# Patient Record
Sex: MALE
[De-identification: names, ages, dates, MRNs, and addresses within clinical notes are randomized per-mention and may not be internally consistent; named-entity substitution may affect disease eponyms.]

---

## 2017-07-08 ENCOUNTER — HOSPITAL ENCOUNTER (EMERGENCY)
Dept: HOSPITAL 25 - UCCORT | Age: 72
Discharge: HOME | End: 2017-07-08
Payer: MEDICARE

## 2017-07-08 VITALS — SYSTOLIC BLOOD PRESSURE: 130 MMHG | DIASTOLIC BLOOD PRESSURE: 86 MMHG

## 2017-07-08 DIAGNOSIS — D18.01: Primary | ICD-10-CM

## 2017-07-08 DIAGNOSIS — I10: ICD-10-CM

## 2017-07-08 DIAGNOSIS — Z87.891: ICD-10-CM

## 2017-07-08 PROCEDURE — 99201: CPT

## 2017-07-08 PROCEDURE — G0463 HOSPITAL OUTPT CLINIC VISIT: HCPCS

## 2017-07-17 NOTE — UC
Skin Complaint HPI





- HPI Summary


HPI Summary: 





patient had a purplish spot on his chin, knicked it while shaving and it bled 

profusely for about 2 hours. has since stopped





- History of Current Complaint


Chief Complaint: UCSkin


Time Seen by Provider: 07/08/17 19:52


Stated Complaint: CHIN SKIN COMPLAINT (BLEEDING)


Hx Obtained From: Patient


Onset/Duration: Sudden Onset, Lasting Hours


Skin Exposure Onset/Duration: Hours Ago


Timing: Constant


Onset Severity: Mild


Current Severity: None


Pain Intensity: 0


Pain Scale Used: 0-10 Numeric


Location: Discrete - chin


Alleviating: Cold Compresses





- Allergy/Home Medications


Allergies/Adverse Reactions: 


 Allergies











Allergy/AdvReac Type Severity Reaction Status Date / Time


 


No Known Allergies Allergy   Verified 07/08/17 19:31











Home Medications: 


 Home Medications





Aspirin [Aspirin 81 MG TAB] 81 mg PO DAILY 07/08/17 [History Confirmed 07/08/17]


Lisinopril [Prinivil TAB 20 mg] 20 mg PO DAILY 07/08/17 [History Confirmed 07/08 /17]


Simvastatin [Zocor 40 MG (NF)] 40 mg PO QPM 07/08/17 [History Confirmed 07/08/17

]











Review of Systems


Constitutional: Negative


Skin: Other - purplish, small spot, bleeding


Eyes: Negative


ENT: Negative


Respiratory: Negative


Cardiovascular: Negative


Gastrointestinal: Negative


Genitourinary: Negative


Motor: Negative


Neurovascular: Negative


Musculoskeletal: Negative


Neurological: Negative


Psychological: Negative


All Other Systems Reviewed And Are Negative: Yes





PMH/Surg Hx/FS Hx/Imm Hx


Previously Healthy: Yes


Cardiovascular History: Hypertension





- Surgical History


Surgical History: Yes


Surgery Procedure, Year, and Place: LEFT INGUINAL HERNIA REPAIR--2012





- Family History


Known Family History: Positive: Hypertension





- Social History


Alcohol Use: Occasionally


Substance Use Type: None


Smoking Status (MU): Former Smoker


When Did the Patient Quit Smoking/Using Tobacco: 25 YRS AGO





Physical Exam


Triage Information Reviewed: Yes


Appearance: Well-Appearing, Well-Nourished, Pain Distress


Vital Signs: 


 Initial Vital Signs











Temp  98.4 F   07/08/17 19:21


 


Pulse  77   07/08/17 19:21


 


Resp  16   07/08/17 19:21


 


BP  130/86   07/08/17 19:21


 


Pulse Ox  99   07/08/17 19:21











Vital Signs Reviewed: Yes


Eye Exam: Normal


ENT Exam: Normal


Dental Exam: Normal


Neck exam: Normal


Respiratory Exam: Normal


Cardiovascular Exam: Normal


Abdominal Exam: Normal


Bowel Sounds: Positive: Present


Musculoskeletal Exam: Normal


Neurological Exam: Normal


Psychological Exam: Normal


Skin: Positive: significant lesion(s) - angioma on the chin, currently not 

bleeding





Course/Dx





- Course


Course Of Treatment: hx obtained, exam performed, meds reviewed, no treatment 

needed, education on angiomas given





- Diagnoses


Provider Diagnoses: cherry angioma





Discharge





- Discharge Plan


Condition: Stable


Disposition: HOME


Referrals: 


Non Staff,Doctor [Primary Care Provider] - 


Additional Instructions: 


1. You have a cherry angioma


2. There is no treatment


3. They can cause perfuse bleeding.


4. Follow up with your dermatologist if bleeding persists.

## 2021-07-21 ENCOUNTER — OUTPATIENT (OUTPATIENT)
Dept: OUTPATIENT SERVICES | Facility: HOSPITAL | Age: 76
LOS: 1 days | End: 2021-07-21
Payer: MEDICARE

## 2021-07-21 VITALS
DIASTOLIC BLOOD PRESSURE: 71 MMHG | HEIGHT: 68 IN | RESPIRATION RATE: 16 BRPM | WEIGHT: 199.96 LBS | TEMPERATURE: 98 F | HEART RATE: 68 BPM | SYSTOLIC BLOOD PRESSURE: 137 MMHG | OXYGEN SATURATION: 97 %

## 2021-07-21 DIAGNOSIS — Z29.9 ENCOUNTER FOR PROPHYLACTIC MEASURES, UNSPECIFIED: ICD-10-CM

## 2021-07-21 DIAGNOSIS — Z98.890 OTHER SPECIFIED POSTPROCEDURAL STATES: Chronic | ICD-10-CM

## 2021-07-21 DIAGNOSIS — Z01.818 ENCOUNTER FOR OTHER PREPROCEDURAL EXAMINATION: ICD-10-CM

## 2021-07-21 DIAGNOSIS — M19.012 PRIMARY OSTEOARTHRITIS, LEFT SHOULDER: ICD-10-CM

## 2021-07-21 LAB
A1C WITH ESTIMATED AVERAGE GLUCOSE RESULT: 5.6 % — SIGNIFICANT CHANGE UP (ref 4–5.6)
ALBUMIN SERPL ELPH-MCNC: 3.6 G/DL — SIGNIFICANT CHANGE UP (ref 3.3–5)
ANION GAP SERPL CALC-SCNC: 1 MMOL/L — LOW (ref 5–17)
APTT BLD: 30.2 SEC — SIGNIFICANT CHANGE UP (ref 27.5–35.5)
BASOPHILS # BLD AUTO: 0.04 K/UL — SIGNIFICANT CHANGE UP (ref 0–0.2)
BASOPHILS NFR BLD AUTO: 0.6 % — SIGNIFICANT CHANGE UP (ref 0–2)
BUN SERPL-MCNC: 18 MG/DL — SIGNIFICANT CHANGE UP (ref 7–23)
CALCIUM SERPL-MCNC: 8.4 MG/DL — LOW (ref 8.5–10.1)
CHLORIDE SERPL-SCNC: 108 MMOL/L — SIGNIFICANT CHANGE UP (ref 96–108)
CO2 SERPL-SCNC: 29 MMOL/L — SIGNIFICANT CHANGE UP (ref 22–31)
CREAT SERPL-MCNC: 0.8 MG/DL — SIGNIFICANT CHANGE UP (ref 0.5–1.3)
EOSINOPHIL # BLD AUTO: 0.08 K/UL — SIGNIFICANT CHANGE UP (ref 0–0.5)
EOSINOPHIL NFR BLD AUTO: 1.3 % — SIGNIFICANT CHANGE UP (ref 0–6)
ESTIMATED AVERAGE GLUCOSE: 114 MG/DL — SIGNIFICANT CHANGE UP (ref 68–114)
GLUCOSE SERPL-MCNC: 93 MG/DL — SIGNIFICANT CHANGE UP (ref 70–99)
HCT VFR BLD CALC: 43.2 % — SIGNIFICANT CHANGE UP (ref 39–50)
HGB BLD-MCNC: 14.2 G/DL — SIGNIFICANT CHANGE UP (ref 13–17)
IMM GRANULOCYTES NFR BLD AUTO: 0.5 % — SIGNIFICANT CHANGE UP (ref 0–1.5)
INR BLD: 1.01 RATIO — SIGNIFICANT CHANGE UP (ref 0.88–1.16)
LYMPHOCYTES # BLD AUTO: 1.74 K/UL — SIGNIFICANT CHANGE UP (ref 1–3.3)
LYMPHOCYTES # BLD AUTO: 27.2 % — SIGNIFICANT CHANGE UP (ref 13–44)
MCHC RBC-ENTMCNC: 31.8 PG — SIGNIFICANT CHANGE UP (ref 27–34)
MCHC RBC-ENTMCNC: 32.9 GM/DL — SIGNIFICANT CHANGE UP (ref 32–36)
MCV RBC AUTO: 96.9 FL — SIGNIFICANT CHANGE UP (ref 80–100)
MONOCYTES # BLD AUTO: 0.68 K/UL — SIGNIFICANT CHANGE UP (ref 0–0.9)
MONOCYTES NFR BLD AUTO: 10.6 % — SIGNIFICANT CHANGE UP (ref 2–14)
NEUTROPHILS # BLD AUTO: 3.83 K/UL — SIGNIFICANT CHANGE UP (ref 1.8–7.4)
NEUTROPHILS NFR BLD AUTO: 59.8 % — SIGNIFICANT CHANGE UP (ref 43–77)
PLATELET # BLD AUTO: 244 K/UL — SIGNIFICANT CHANGE UP (ref 150–400)
POTASSIUM SERPL-MCNC: 4.4 MMOL/L — SIGNIFICANT CHANGE UP (ref 3.5–5.3)
POTASSIUM SERPL-SCNC: 4.4 MMOL/L — SIGNIFICANT CHANGE UP (ref 3.5–5.3)
PROTHROM AB SERPL-ACNC: 11.8 SEC — SIGNIFICANT CHANGE UP (ref 10.6–13.6)
RBC # BLD: 4.46 M/UL — SIGNIFICANT CHANGE UP (ref 4.2–5.8)
RBC # FLD: 13.7 % — SIGNIFICANT CHANGE UP (ref 10.3–14.5)
SODIUM SERPL-SCNC: 138 MMOL/L — SIGNIFICANT CHANGE UP (ref 135–145)
WBC # BLD: 6.4 K/UL — SIGNIFICANT CHANGE UP (ref 3.8–10.5)
WBC # FLD AUTO: 6.4 K/UL — SIGNIFICANT CHANGE UP (ref 3.8–10.5)

## 2021-07-21 PROCEDURE — 93005 ELECTROCARDIOGRAM TRACING: CPT

## 2021-07-21 PROCEDURE — 86900 BLOOD TYPING SEROLOGIC ABO: CPT

## 2021-07-21 PROCEDURE — 83036 HEMOGLOBIN GLYCOSYLATED A1C: CPT

## 2021-07-21 PROCEDURE — 86901 BLOOD TYPING SEROLOGIC RH(D): CPT

## 2021-07-21 PROCEDURE — 87640 STAPH A DNA AMP PROBE: CPT

## 2021-07-21 PROCEDURE — 85025 COMPLETE CBC W/AUTO DIFF WBC: CPT

## 2021-07-21 PROCEDURE — 71046 X-RAY EXAM CHEST 2 VIEWS: CPT

## 2021-07-21 PROCEDURE — 93010 ELECTROCARDIOGRAM REPORT: CPT

## 2021-07-21 PROCEDURE — 85730 THROMBOPLASTIN TIME PARTIAL: CPT

## 2021-07-21 PROCEDURE — 85610 PROTHROMBIN TIME: CPT

## 2021-07-21 PROCEDURE — 71046 X-RAY EXAM CHEST 2 VIEWS: CPT | Mod: 26

## 2021-07-21 PROCEDURE — 82040 ASSAY OF SERUM ALBUMIN: CPT

## 2021-07-21 PROCEDURE — 86850 RBC ANTIBODY SCREEN: CPT

## 2021-07-21 PROCEDURE — 87641 MR-STAPH DNA AMP PROBE: CPT

## 2021-07-21 PROCEDURE — 80048 BASIC METABOLIC PNL TOTAL CA: CPT

## 2021-07-21 PROCEDURE — G0463: CPT | Mod: 25

## 2021-07-21 PROCEDURE — 36415 COLL VENOUS BLD VENIPUNCTURE: CPT

## 2021-07-21 NOTE — H&P PST ADULT - BP NONINVASIVE DIASTOLIC (MM HG)
Consent: The patient's consent was obtained including but not limited to risks of crusting, scabbing, blistering, scarring, darker or lighter pigmentary change, recurrence, incomplete removal and infection. Duration Of Freeze Thaw-Cycle (Seconds): 0 Post-Care Instructions: I reviewed with the patient in detail post-care instructions. Patient is to wear sunprotection, and avoid picking at any of the treated lesions. Pt may apply Vaseline to crusted or scabbing areas. Render Note In Bullet Format When Appropriate: No 71 Detail Level: Detailed

## 2021-07-21 NOTE — H&P PST ADULT - NSICDXFAMILYHX_GEN_ALL_CORE_FT
FAMILY HISTORY:  FH: heart disease, Mother, , age 75  FH: stroke, Father, , age 87;;;also colon cancer

## 2021-07-21 NOTE — H&P PST ADULT - NS MD HP PULSE RADIAL
Cryosurgery for Skin Conditions, Care After  These instructions give you information on caring for yourself after your procedure. Your doctor may also give you more specific instructions. Call your doctor if you have any problems or questions after your procedure.  Follow these instructions at home:  Caring for the treated area  · Follow instructions from your doctor about how to take care of the treated area. Make sure you:  ? Keep the area covered with a bandage (dressing) until it heals, or for as long as told by your doctor.  ? Wash your hands with soap and water before you change your bandage. If you do not have soap and water, use hand .  ? Change your bandage as told by your doctor.  ? Keep the bandage and the treated area clean and dry. If the bandage gets wet, change it right away.  ? Clean the treated area with soap and water.  · Check the treated area every day for signs of infection. Check for:  ? More redness, swelling, or pain.  ? More fluid or blood.  ? Warmth.  ? Pus or a bad smell.  General instructions  · Do not pick at your blister. Do not try to break it open. This can cause infection and scarring.  · Do not put any medicine, cream, or lotion on the treated area unless told by your doctor.  · Take over-the-counter and prescription medicines only as told by your doctor.  · Keep all follow-up visits as told by your doctor. This is important.  Contact a doctor if:  · You have more redness, swelling, or pain around the treated area.  · You have more fluid or blood coming from the treated area.  · The treated area feels warm to the touch.  · You have pus or a bad smell coming from the treated area.  · Your blister gets large and painful.  Get help right away if:  · You have a fever and have redness spreading from the treated area.  Summary  · You should keep the treated area and your bandage clean and dry.  · Check the treated area every day for signs of infection. Signs include fluid, pus,  warmth, or having more redness, swelling, or pain.  · Do not pick at your blister. Do not try to break it open.  This information is not intended to replace advice given to you by your health care provider. Make sure you discuss any questions you have with your health care provider.  Document Released: 03/11/2013 Document Revised: 11/06/2017 Document Reviewed: 11/06/2017  Elsevier Interactive Patient Education © 2019 [employer]     right normal/left normal

## 2021-07-21 NOTE — H&P PST ADULT - ASSESSMENT
76 y.o male scheduled for  76 y.o male scheduled for Left Total Shoulder Replacement   Plan  1. Stop all NSAIDS, herbal supplements and vitamins for 7 days.  2. NPO at midnight.  3. Take the following medications Lisinopril with small sips of water on the morning of your procedure/surgery.  4. Use EZ sponges as directed  5. Use mupirocin as directed  6. Labs, EKG, CXR as per surgeon  7. PMD Dr Dennison & Dr Montanez cardiologist visit for optimization prior to surgery as per surgeon.  8. COVID swab appt: 2021    CAPRINI SCORE    AGE RELATED RISK FACTORS                                                       MOBILITY RELATED FACTORS  [ ] Age 41-60 years                                            (1 Point)                  [ ] Bed rest                                                        (1 Point)  [ ] Age: 61-74 years                                           (2 Points)                [ ] Plaster cast                                                   (2 Points)  [x ] Age= 75 years                                              (3 Points)                 [ ] Bed bound for more than 72 hours                   (2 Points)    DISEASE RELATED RISK FACTORS                                               GENDER SPECIFIC FACTORS  [x ] Edema in the lower extremities                       (1 Point)                  [ ] Pregnancy                                                     (1 Point)  [ ] Varicose veins                                               (1 Point)                  [ ] Post-partum < 6 weeks                                   (1 Point)             [x] BMI > 25 Kg/m2                                            (1 Point)                  [ ] Hormonal therapy  or oral contraception            (1 Point)                 [ ] Sepsis (in the previous month)                        (1 Point)                  [ ] History of pregnancy complications  [ ] Pneumonia or serious lung disease                                               [ ] Unexplained or recurrent                       (1 Point)           (in the previous month)                               (1 Point)  [ ] Abnormal pulmonary function test                     (1 Point)                 SURGERY RELATED RISK FACTORS  [ ] Acute myocardial infarction                              (1 Point)                 [ ]  Section                                            (1 Point)  [ ] Congestive heart failure (in the previous month)  (1 Point)                 [ ] Minor surgery                                                 (1 Point)   [ ] Inflammatory bowel disease                             (1 Point)                 [ ] Arthroscopic surgery                                        (2 Points)  [ ] Central venous access                                    (2 Points)                [ ] General surgery lasting more than 45 minutes   (2 Points)       [ ] Stroke (in the previous month)                          (5 Points)               [x ] Elective arthroplasty                                        (5 Points)                                                                                                                                               HEMATOLOGY RELATED FACTORS                                                 TRAUMA RELATED RISK FACTORS  [ ] Prior episodes of VTE                                     (3 Points)                 [ ] Fracture of the hip, pelvis, or leg                       (5 Points)  [ ] Positive family history for VTE                         (3 Points)                 [ ] Acute spinal cord injury (in the previous month)  (5 Points)  [ ] Prothrombin 47825 A                                      (3 Points)                 [ ] Paralysis  (less than 1 month)                          (5 Points)  [ ] Factor V Leiden                                             (3 Points)                 [ ] Multiple Trauma within 1 month                         (5 Points)  [ ] Lupus anticoagulants                                     (3 Points)                                                           [ ] Anticardiolipin antibodies                                (3 Points)                                                       [ ] High homocysteine in the blood                      (3 Points)                                             [ ] Other congenital or acquired thrombophilia       (3 Points)                                                [ ] Heparin induced thrombocytopenia                  (3 Points)                                          Total Score [      10    ]    The Caprini score indicates that this patient is at high risk for a VTE event (score > = 6).    Ssurgical patients in this group will benefit from both pharmacologic prophylaxis and intermittent compression devices.    The surgical team will determine the balance between VTE risk and bleeding risk, and other clinical considerations.

## 2021-07-21 NOTE — H&P PST ADULT - HISTORY OF PRESENT ILLNESS
76 y.o WD, WN male presents for PST with hx of left shoulder pain. Patient has left shoulder pain for the past 10 yrs. He states it has been progressively worsening over the years. He reports his shoulder "popping out" at times. Patient also reports decreased strength in that arm. He has followed with ortho , diagnostics revealing advanced arthritis. Patient is now opting for surgical intervention. Scheduled for Left Total Shoulder Replacement

## 2021-07-21 NOTE — H&P PST ADULT - NSICDXPASTMEDICALHX_GEN_ALL_CORE_FT
PAST MEDICAL HISTORY:  Arthritis     COVID-19 vaccine series completed Moderna--completed 3/4/2021    HTN (hypertension)     Hyperlipidemia

## 2021-07-22 DIAGNOSIS — M19.012 PRIMARY OSTEOARTHRITIS, LEFT SHOULDER: ICD-10-CM

## 2021-07-22 DIAGNOSIS — Z01.818 ENCOUNTER FOR OTHER PREPROCEDURAL EXAMINATION: ICD-10-CM

## 2021-07-22 LAB
MRSA PCR RESULT.: SIGNIFICANT CHANGE UP
S AUREUS DNA NOSE QL NAA+PROBE: DETECTED

## 2021-07-22 NOTE — CHART NOTE - NSCHARTNOTEFT_GEN_A_CORE
MSSA detected from nasal swab done in PST 7/21/21. Mupirocin instructions given to patient. To start Mupirocin 7/24/2021.

## 2021-07-25 DIAGNOSIS — Z01.818 ENCOUNTER FOR OTHER PREPROCEDURAL EXAMINATION: ICD-10-CM

## 2021-07-25 PROBLEM — Z00.00 ENCOUNTER FOR PREVENTIVE HEALTH EXAMINATION: Status: ACTIVE | Noted: 2021-07-25

## 2021-07-26 ENCOUNTER — APPOINTMENT (OUTPATIENT)
Dept: DISASTER EMERGENCY | Facility: CLINIC | Age: 76
End: 2021-07-26

## 2021-07-27 LAB — SARS-COV-2 N GENE NPH QL NAA+PROBE: NOT DETECTED

## 2021-07-28 RX ORDER — SODIUM CHLORIDE 9 MG/ML
1000 INJECTION, SOLUTION INTRAVENOUS
Refills: 0 | Status: DISCONTINUED | OUTPATIENT
Start: 2021-07-29 | End: 2021-07-29

## 2021-07-28 RX ORDER — ONDANSETRON 8 MG/1
4 TABLET, FILM COATED ORAL ONCE
Refills: 0 | Status: DISCONTINUED | OUTPATIENT
Start: 2021-07-29 | End: 2021-07-29

## 2021-07-28 RX ORDER — OXYCODONE HYDROCHLORIDE 5 MG/1
10 TABLET ORAL ONCE
Refills: 0 | Status: DISCONTINUED | OUTPATIENT
Start: 2021-07-29 | End: 2021-07-29

## 2021-07-28 RX ORDER — FENTANYL CITRATE 50 UG/ML
50 INJECTION INTRAVENOUS
Refills: 0 | Status: DISCONTINUED | OUTPATIENT
Start: 2021-07-29 | End: 2021-07-29

## 2021-07-29 ENCOUNTER — RESULT REVIEW (OUTPATIENT)
Age: 76
End: 2021-07-29

## 2021-07-29 ENCOUNTER — OUTPATIENT (OUTPATIENT)
Dept: INPATIENT UNIT | Facility: HOSPITAL | Age: 76
LOS: 1 days | Discharge: HOME | End: 2021-07-29
Payer: MEDICARE

## 2021-07-29 ENCOUNTER — TRANSCRIPTION ENCOUNTER (OUTPATIENT)
Age: 76
End: 2021-07-29

## 2021-07-29 VITALS
TEMPERATURE: 98 F | HEIGHT: 68 IN | RESPIRATION RATE: 16 BRPM | HEART RATE: 85 BPM | WEIGHT: 199.08 LBS | SYSTOLIC BLOOD PRESSURE: 122 MMHG | DIASTOLIC BLOOD PRESSURE: 80 MMHG | OXYGEN SATURATION: 97 %

## 2021-07-29 DIAGNOSIS — M11.212 OTHER CHONDROCALCINOSIS, LEFT SHOULDER: ICD-10-CM

## 2021-07-29 DIAGNOSIS — E78.5 HYPERLIPIDEMIA, UNSPECIFIED: ICD-10-CM

## 2021-07-29 DIAGNOSIS — M19.012 PRIMARY OSTEOARTHRITIS, LEFT SHOULDER: ICD-10-CM

## 2021-07-29 DIAGNOSIS — Z98.890 OTHER SPECIFIED POSTPROCEDURAL STATES: Chronic | ICD-10-CM

## 2021-07-29 DIAGNOSIS — Z79.1 LONG TERM (CURRENT) USE OF NON-STEROIDAL ANTI-INFLAMMATORIES (NSAID): ICD-10-CM

## 2021-07-29 DIAGNOSIS — Z79.01 LONG TERM (CURRENT) USE OF ANTICOAGULANTS: ICD-10-CM

## 2021-07-29 DIAGNOSIS — I10 ESSENTIAL (PRIMARY) HYPERTENSION: ICD-10-CM

## 2021-07-29 DIAGNOSIS — Z79.82 LONG TERM (CURRENT) USE OF ASPIRIN: ICD-10-CM

## 2021-07-29 DIAGNOSIS — Z79.83 LONG TERM (CURRENT) USE OF BISPHOSPHONATES: ICD-10-CM

## 2021-07-29 LAB
ANION GAP SERPL CALC-SCNC: 5 MMOL/L — SIGNIFICANT CHANGE UP (ref 5–17)
BUN SERPL-MCNC: 17 MG/DL — SIGNIFICANT CHANGE UP (ref 7–23)
CALCIUM SERPL-MCNC: 8.1 MG/DL — LOW (ref 8.5–10.1)
CHLORIDE SERPL-SCNC: 109 MMOL/L — HIGH (ref 96–108)
CO2 SERPL-SCNC: 27 MMOL/L — SIGNIFICANT CHANGE UP (ref 22–31)
CREAT SERPL-MCNC: 0.77 MG/DL — SIGNIFICANT CHANGE UP (ref 0.5–1.3)
GLUCOSE SERPL-MCNC: 106 MG/DL — HIGH (ref 70–99)
HCT VFR BLD CALC: 38.6 % — LOW (ref 39–50)
HGB BLD-MCNC: 13.2 G/DL — SIGNIFICANT CHANGE UP (ref 13–17)
MCHC RBC-ENTMCNC: 32.7 PG — SIGNIFICANT CHANGE UP (ref 27–34)
MCHC RBC-ENTMCNC: 34.2 GM/DL — SIGNIFICANT CHANGE UP (ref 32–36)
MCV RBC AUTO: 95.5 FL — SIGNIFICANT CHANGE UP (ref 80–100)
PLATELET # BLD AUTO: 201 K/UL — SIGNIFICANT CHANGE UP (ref 150–400)
POTASSIUM SERPL-MCNC: 4 MMOL/L — SIGNIFICANT CHANGE UP (ref 3.5–5.3)
POTASSIUM SERPL-SCNC: 4 MMOL/L — SIGNIFICANT CHANGE UP (ref 3.5–5.3)
RBC # BLD: 4.04 M/UL — LOW (ref 4.2–5.8)
RBC # FLD: 13.4 % — SIGNIFICANT CHANGE UP (ref 10.3–14.5)
SODIUM SERPL-SCNC: 141 MMOL/L — SIGNIFICANT CHANGE UP (ref 135–145)
WBC # BLD: 8.84 K/UL — SIGNIFICANT CHANGE UP (ref 3.8–10.5)
WBC # FLD AUTO: 8.84 K/UL — SIGNIFICANT CHANGE UP (ref 3.8–10.5)

## 2021-07-29 PROCEDURE — 99221 1ST HOSP IP/OBS SF/LOW 40: CPT

## 2021-07-29 PROCEDURE — C1776: CPT

## 2021-07-29 PROCEDURE — 99223 1ST HOSP IP/OBS HIGH 75: CPT

## 2021-07-29 PROCEDURE — C1769: CPT

## 2021-07-29 PROCEDURE — 80048 BASIC METABOLIC PNL TOTAL CA: CPT

## 2021-07-29 PROCEDURE — 82962 GLUCOSE BLOOD TEST: CPT

## 2021-07-29 PROCEDURE — 86769 SARS-COV-2 COVID-19 ANTIBODY: CPT

## 2021-07-29 PROCEDURE — 97116 GAIT TRAINING THERAPY: CPT | Mod: GP

## 2021-07-29 PROCEDURE — 88304 TISSUE EXAM BY PATHOLOGIST: CPT

## 2021-07-29 PROCEDURE — 88311 DECALCIFY TISSUE: CPT | Mod: 26

## 2021-07-29 PROCEDURE — 85027 COMPLETE CBC AUTOMATED: CPT

## 2021-07-29 PROCEDURE — 88304 TISSUE EXAM BY PATHOLOGIST: CPT | Mod: 26

## 2021-07-29 PROCEDURE — 73030 X-RAY EXAM OF SHOULDER: CPT | Mod: 26,LT

## 2021-07-29 PROCEDURE — C1713: CPT

## 2021-07-29 PROCEDURE — 36415 COLL VENOUS BLD VENIPUNCTURE: CPT

## 2021-07-29 PROCEDURE — 97162 PT EVAL MOD COMPLEX 30 MIN: CPT | Mod: GP

## 2021-07-29 PROCEDURE — 88311 DECALCIFY TISSUE: CPT

## 2021-07-29 PROCEDURE — 73030 X-RAY EXAM OF SHOULDER: CPT | Mod: LT

## 2021-07-29 RX ORDER — ACETAMINOPHEN 500 MG
650 TABLET ORAL EVERY 6 HOURS
Refills: 0 | Status: DISCONTINUED | OUTPATIENT
Start: 2021-07-29 | End: 2021-07-30

## 2021-07-29 RX ORDER — CELECOXIB 200 MG/1
0 CAPSULE ORAL
Qty: 0 | Refills: 0 | DISCHARGE

## 2021-07-29 RX ORDER — SENNA PLUS 8.6 MG/1
2 TABLET ORAL AT BEDTIME
Refills: 0 | Status: DISCONTINUED | OUTPATIENT
Start: 2021-07-29 | End: 2021-07-30

## 2021-07-29 RX ORDER — CEFAZOLIN SODIUM 1 G
2000 VIAL (EA) INJECTION EVERY 8 HOURS
Refills: 0 | Status: COMPLETED | OUTPATIENT
Start: 2021-07-29 | End: 2021-07-30

## 2021-07-29 RX ORDER — PANTOPRAZOLE SODIUM 20 MG/1
1 TABLET, DELAYED RELEASE ORAL
Qty: 30 | Refills: 0
Start: 2021-07-29 | End: 2021-08-27

## 2021-07-29 RX ORDER — ASPIRIN/CALCIUM CARB/MAGNESIUM 324 MG
0 TABLET ORAL
Qty: 0 | Refills: 0 | DISCHARGE

## 2021-07-29 RX ORDER — PROCHLORPERAZINE MALEATE 5 MG
5 TABLET ORAL ONCE
Refills: 0 | Status: DISCONTINUED | OUTPATIENT
Start: 2021-07-29 | End: 2021-07-30

## 2021-07-29 RX ORDER — PANTOPRAZOLE SODIUM 20 MG/1
40 TABLET, DELAYED RELEASE ORAL
Refills: 0 | Status: DISCONTINUED | OUTPATIENT
Start: 2021-07-29 | End: 2021-07-30

## 2021-07-29 RX ORDER — OXYCODONE HYDROCHLORIDE 5 MG/1
5 TABLET ORAL
Refills: 0 | Status: DISCONTINUED | OUTPATIENT
Start: 2021-07-29 | End: 2021-07-30

## 2021-07-29 RX ORDER — MAGNESIUM HYDROXIDE 400 MG/1
30 TABLET, CHEWABLE ORAL DAILY
Refills: 0 | Status: DISCONTINUED | OUTPATIENT
Start: 2021-07-29 | End: 2021-07-30

## 2021-07-29 RX ORDER — SODIUM CHLORIDE 9 MG/ML
1000 INJECTION, SOLUTION INTRAVENOUS
Refills: 0 | Status: DISCONTINUED | OUTPATIENT
Start: 2021-07-29 | End: 2021-07-30

## 2021-07-29 RX ORDER — TOBRAMYCIN 0.3 %
1 DROPS OPHTHALMIC (EYE) EVERY 4 HOURS
Refills: 0 | Status: DISCONTINUED | OUTPATIENT
Start: 2021-07-29 | End: 2021-07-30

## 2021-07-29 RX ORDER — ONDANSETRON 8 MG/1
8 TABLET, FILM COATED ORAL EVERY 8 HOURS
Refills: 0 | Status: DISCONTINUED | OUTPATIENT
Start: 2021-07-29 | End: 2021-07-30

## 2021-07-29 RX ORDER — ENOXAPARIN SODIUM 100 MG/ML
40 INJECTION SUBCUTANEOUS DAILY
Refills: 0 | Status: DISCONTINUED | OUTPATIENT
Start: 2021-07-30 | End: 2021-07-30

## 2021-07-29 RX ORDER — DEXAMETHASONE 0.5 MG/5ML
8 ELIXIR ORAL ONCE
Refills: 0 | Status: COMPLETED | OUTPATIENT
Start: 2021-07-30 | End: 2021-07-30

## 2021-07-29 RX ORDER — ACETAMINOPHEN 500 MG
650 TABLET ORAL ONCE
Refills: 0 | Status: COMPLETED | OUTPATIENT
Start: 2021-07-29 | End: 2021-07-29

## 2021-07-29 RX ORDER — CELECOXIB 200 MG/1
200 CAPSULE ORAL ONCE
Refills: 0 | Status: COMPLETED | OUTPATIENT
Start: 2021-07-29 | End: 2021-07-29

## 2021-07-29 RX ORDER — POLYETHYLENE GLYCOL 3350 17 G/17G
17 POWDER, FOR SOLUTION ORAL AT BEDTIME
Refills: 0 | Status: DISCONTINUED | OUTPATIENT
Start: 2021-07-29 | End: 2021-07-30

## 2021-07-29 RX ORDER — LANOLIN ALCOHOL/MO/W.PET/CERES
3 CREAM (GRAM) TOPICAL AT BEDTIME
Refills: 0 | Status: DISCONTINUED | OUTPATIENT
Start: 2021-07-29 | End: 2021-07-30

## 2021-07-29 RX ORDER — OXYCODONE HYDROCHLORIDE 5 MG/1
10 TABLET ORAL
Refills: 0 | Status: DISCONTINUED | OUTPATIENT
Start: 2021-07-29 | End: 2021-07-30

## 2021-07-29 RX ORDER — POLYETHYLENE GLYCOL 3350 17 G/17G
17 POWDER, FOR SOLUTION ORAL
Qty: 1 | Refills: 0
Start: 2021-07-29 | End: 2021-08-11

## 2021-07-29 RX ORDER — OXYCODONE HYDROCHLORIDE 5 MG/1
10 TABLET ORAL ONCE
Refills: 0 | Status: DISCONTINUED | OUTPATIENT
Start: 2021-07-29 | End: 2021-07-29

## 2021-07-29 RX ORDER — SIMVASTATIN 20 MG/1
1 TABLET, FILM COATED ORAL
Qty: 0 | Refills: 0 | DISCHARGE

## 2021-07-29 RX ORDER — LISINOPRIL 2.5 MG/1
1 TABLET ORAL
Qty: 0 | Refills: 0 | DISCHARGE

## 2021-07-29 RX ORDER — CELECOXIB 200 MG/1
200 CAPSULE ORAL EVERY 12 HOURS
Refills: 0 | Status: DISCONTINUED | OUTPATIENT
Start: 2021-07-29 | End: 2021-07-30

## 2021-07-29 RX ORDER — TRAMADOL HYDROCHLORIDE 50 MG/1
50 TABLET ORAL EVERY 4 HOURS
Refills: 0 | Status: DISCONTINUED | OUTPATIENT
Start: 2021-07-29 | End: 2021-07-30

## 2021-07-29 RX ORDER — ACETAMINOPHEN 500 MG
2 TABLET ORAL
Qty: 84 | Refills: 0
Start: 2021-07-29 | End: 2021-08-11

## 2021-07-29 RX ORDER — OXYCODONE HYDROCHLORIDE 5 MG/1
1 TABLET ORAL
Qty: 30 | Refills: 0
Start: 2021-07-29 | End: 2021-08-02

## 2021-07-29 RX ORDER — SENNA PLUS 8.6 MG/1
2 TABLET ORAL
Qty: 28 | Refills: 0
Start: 2021-07-29 | End: 2021-08-11

## 2021-07-29 RX ORDER — ENOXAPARIN SODIUM 100 MG/ML
40 INJECTION SUBCUTANEOUS ONCE
Refills: 0 | Status: COMPLETED | OUTPATIENT
Start: 2021-07-29 | End: 2021-07-29

## 2021-07-29 RX ORDER — NIFEDIPINE 30 MG
1 TABLET, EXTENDED RELEASE 24 HR ORAL
Qty: 0 | Refills: 0 | DISCHARGE

## 2021-07-29 RX ORDER — NIFEDIPINE 30 MG
60 TABLET, EXTENDED RELEASE 24 HR ORAL DAILY
Refills: 0 | Status: DISCONTINUED | OUTPATIENT
Start: 2021-07-29 | End: 2021-07-30

## 2021-07-29 RX ORDER — LISINOPRIL 2.5 MG/1
20 TABLET ORAL DAILY
Refills: 0 | Status: DISCONTINUED | OUTPATIENT
Start: 2021-07-29 | End: 2021-07-30

## 2021-07-29 RX ORDER — PANTOPRAZOLE SODIUM 20 MG/1
40 TABLET, DELAYED RELEASE ORAL ONCE
Refills: 0 | Status: COMPLETED | OUTPATIENT
Start: 2021-07-29 | End: 2021-07-29

## 2021-07-29 RX ORDER — HYDROMORPHONE HYDROCHLORIDE 2 MG/ML
0.5 INJECTION INTRAMUSCULAR; INTRAVENOUS; SUBCUTANEOUS ONCE
Refills: 0 | Status: DISCONTINUED | OUTPATIENT
Start: 2021-07-29 | End: 2021-07-30

## 2021-07-29 RX ORDER — SIMVASTATIN 20 MG/1
40 TABLET, FILM COATED ORAL AT BEDTIME
Refills: 0 | Status: DISCONTINUED | OUTPATIENT
Start: 2021-07-29 | End: 2021-07-30

## 2021-07-29 RX ADMIN — ENOXAPARIN SODIUM 40 MILLIGRAM(S): 100 INJECTION SUBCUTANEOUS at 22:48

## 2021-07-29 RX ADMIN — Medication 650 MILLIGRAM(S): at 22:47

## 2021-07-29 RX ADMIN — CELECOXIB 200 MILLIGRAM(S): 200 CAPSULE ORAL at 09:08

## 2021-07-29 RX ADMIN — Medication 1 DROP(S): at 22:48

## 2021-07-29 RX ADMIN — Medication 3 MILLIGRAM(S): at 23:08

## 2021-07-29 RX ADMIN — Medication 650 MILLIGRAM(S): at 09:08

## 2021-07-29 RX ADMIN — CELECOXIB 200 MILLIGRAM(S): 200 CAPSULE ORAL at 22:48

## 2021-07-29 RX ADMIN — Medication 100 MILLIGRAM(S): at 17:01

## 2021-07-29 RX ADMIN — Medication 650 MILLIGRAM(S): at 17:00

## 2021-07-29 RX ADMIN — SIMVASTATIN 40 MILLIGRAM(S): 20 TABLET, FILM COATED ORAL at 22:46

## 2021-07-29 RX ADMIN — Medication 650 MILLIGRAM(S): at 16:59

## 2021-07-29 RX ADMIN — Medication 60 MILLIGRAM(S): at 22:46

## 2021-07-29 RX ADMIN — Medication 1 DROP(S): at 17:55

## 2021-07-29 RX ADMIN — OXYCODONE HYDROCHLORIDE 5 MILLIGRAM(S): 5 TABLET ORAL at 20:13

## 2021-07-29 RX ADMIN — PANTOPRAZOLE SODIUM 40 MILLIGRAM(S): 20 TABLET, DELAYED RELEASE ORAL at 09:08

## 2021-07-29 RX ADMIN — OXYCODONE HYDROCHLORIDE 10 MILLIGRAM(S): 5 TABLET ORAL at 09:08

## 2021-07-29 RX ADMIN — OXYCODONE HYDROCHLORIDE 5 MILLIGRAM(S): 5 TABLET ORAL at 20:43

## 2021-07-29 NOTE — DISCHARGE NOTE PROVIDER - NSDCCPCAREPLAN_GEN_ALL_CORE_FT
PRINCIPAL DISCHARGE DIAGNOSIS  Diagnosis: Primary osteoarthritis of left shoulder  Assessment and Plan of Treatment:

## 2021-07-29 NOTE — CONSULT NOTE ADULT - ATTENDING COMMENTS
Pt seen and examined on 2N. d/w NP konstantin davila. agree with documentation as above with changes made where appropriate. Pt admitted for elective left shoulder replacement.   Doing well. Tolerating po. hasn't voided yet.   hasn't' ambulated yet.   continue pain meds prn.   ivf   physical therapy  thank you, will follow.

## 2021-07-29 NOTE — DISCHARGE NOTE PROVIDER - NSDCFUADDINST_GEN_ALL_CORE_FT
Left Total Shoulder Discharge Instructions     1. Activity: No Aggressive ROM until cleared by Dr. Paulson. Maintain sling at all times EXCEPT to straighten elbow a few times daily. Elevate hand and wiggle fingers. Do not start any outpatient PT until you see Dr. Paulson in the office.    2. Call with fever over 101, wound redness, drainage.    3. Wound care: Do not remove or change dressing unless saturated.  IF so, apply dry gauze, tegaderm. Be careful not to removed mesh that is glued to the wound. There are no sutures or staples to remove.    4. Continue to apply ICE packs.    5. DVT PE Prophylaxis: Per Anticoagulation Team. See med Rec.    6. Follow Up: Orthopedics, Dr. PAULSON  10-14 days in office. Call to schedule. If going home, eRx sent to your pharmacy for .

## 2021-07-29 NOTE — PATIENT PROFILE ADULT - LIVING ENVIRONMENT
Acute Care - Occupational Therapy Treatment Note  [facility] [place] [facility]     Patient Name: Brendon Skelton  : 1946  MRN: [id number]  Today's Date: 2019  Onset of Illness/Injury or Date of Surgery: 19  Date of Referral to OT: 19  Referring Physician: LATISHA Reaves MD    Admit Date: 2019       ICD-10-CM ICD-9-CM   1. Gastrointestinal hemorrhage, unspecified gastrointestinal hemorrhage type K92.2 578.9   2. Supratherapeutic INR R79.1 790.92   3. SSS (sick sinus syndrome) (CMS/HCC) I49.5 427.81   4. Atherosclerosis of native coronary artery of native heart, angina presence unspecified I25.10 414.01   5. 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 S/P AVR Z95.2 V43.3   7. Impaired physical mobility Z74.09 781.99   8. Impaired mobility and activities of daily living Z74.09 799.89     Patient Active Problem List   Diagnosis   • Long term current use of anticoagulant therapy   • Personal history of heart valve replacement   • Atrial fibrillation [I48.91]   • Emphysema of lung (CMS/HCC)   • On anticoagulant therapy   • Hyperlipidemia   • Diastolic heart failure (CMS/HCC)   • Depressive disorder   • COPD (chronic obstructive pulmonary disease) (CMS/HCC)   • Diabetes mellitus (CMS/HCC)   • Myopia   • Astigmatism   • Bleeding from open wound of chest wall   • Follow-up surgery care   • Encounter for screening for malignant neoplasm of colon   • Positive colorectal cancer screening using DNA-based stool test   • Nodule of left lung   • Chronic hypoxemic respiratory failure (CMS/HCC)   • Physical deconditioning   • Heart failure with preserved left ventricular function (HFpEF) (CMS/HCC)   • Essential hypertension   • Coronary artery disease involving native coronary artery without angina pectoris   • Hx of CABG   • SSS (sick sinus syndrome) (CMS/HCC)   • Pacemaker   • CKD (chronic kidney disease) stage 3, GFR 30-59 ml/min (CMS/HCC)   •  Personal history of tobacco use, presenting hazards to health   • Gastrointestinal hemorrhage   • Morbid obesity (CMS/HCC)   • Gastritis   • Colon polyp   • Coumadin toxicity     Past Medical History:   Diagnosis Date   • Acute bronchitis    • Anxiety    • Atrial fibrillation (CMS/HCC)    • C. difficile colitis    • Callosity     under metatarsal head      • Cardiac pacemaker in situ    • CHF (congestive heart failure) (CMS/HCC)    • Chronic obstructive lung disease (CMS/HCC)    • Corns and callus    • Depressive disorder    • Diabetes mellitus (CMS/HCC)    • Diastolic heart failure (CMS/HCC)    • Essential hypertension    • Foot pain    • Hyperlipidemia    • Long term current use of anticoagulant    • On anticoagulant therapy    • Pulmonary emphysema (CMS/HCC)    • Rectal hemorrhage    • Type 2 diabetes mellitus (CMS/HCC)      Past Surgical History:   Procedure Laterality Date   • AORTIC VALVE REPAIR/REPLACEMENT  1999   • CARDIAC ELECTROPHYSIOLOGY PROCEDURE N/A 3/20/2017    Procedure: PPM generator change - dual;  Surgeon: Bereket Gates MD;  Location: [facility] CATH INVASIVE LOCATION;  Service:    • CARDIAC PACEMAKER PLACEMENT  1999   • CATARACT EXTRACTION W/ INTRAOCULAR LENS IMPLANT Left 2/1/2019    Procedure: REMOVE CATARACT AND IMPLANT INTRAOCULAR LENS I;  Surgeon: Jose L Clay MD;  Location: [facility] OR;  Service: Ophthalmology   • CATARACT EXTRACTION W/ INTRAOCULAR LENS IMPLANT Right 2/8/2019    Procedure: REMOVE CATARACT AND IMPLANT  INTRAOCULAR LENS;  Surgeon: Jose L Clay MD;  Location: [facility] OR;  Service: Ophthalmology   • COLONOSCOPY N/A 6/19/2019    Procedure: COLONOSCOPY WITH CONTROL OF BLEED;  Surgeon: Alfredo Guerrero MD;  Location: [facility] ENDOSCOPY;  Service: Gastroenterology   • COLONOSCOPY N/A 6/24/2019    Procedure: COLONOSCOPY;  Surgeon: Alfredo Guerrero MD;  Location: [facility] ENDOSCOPY;  Service: Gastroenterology   • ECHO - CONVERTED  11/12/2013    There is mild to moderate  left atrial enlargement EF 50-55%   • ENDOSCOPY N/A 6/19/2019    Procedure: ESOPHAGOGASTRODUODENOSCOPY WITH CONTROL OF BLEED;  Surgeon: Alfredo Guerrero MD;  Location: [facility] ENDOSCOPY;  Service: Gastroenterology   • FOOT SURGERY  1990   • OTHER SURGICAL HISTORY  10/26/2015    PARING CORN/CALLUS    • PACEMAKER REPLACEMENT N/A 3/21/2017    Procedure: revision pacemaker pocket, evacuation hematoma, control of bleeding;  Surgeon: Polo Feliz MD;  Location: [facility] OR;  Service:    • TRANSESOPHAGEAL ECHOCARDIOGRAM (MITZY)  05/01/2014    With color flow-Mild left atrial enlargement with mild concentric LV hypertrophy with top normal aortic root size. EF 45-50%. Mild mitral regurgitation and mild aortic insufficiency and trivial amount of tricuspid regurgation   • TUBAL ABDOMINAL LIGATION         Therapy Treatment    Rehabilitation Treatment Summary     Row Name 07/04/19 1055 07/04/19 0805          Treatment Time/Intention    Discipline  physical therapy assistant  -TA  occupational therapy assistant  -LW     Document Type  therapy note (daily note)  -TA  therapy note (daily note)  -LW     Subjective Information  no complaints;fatigue;dyspnea  -TA  complains of;fatigue  -LW     Mode of Treatment  physical therapy;individual therapy  -TA  occupational therapy  -LW     Patient/Family Observations  --  Pt supine in bed. No family present.   -LW     Care Plan Review  --  care plan/treatment goals reviewed  -LW     Total Minutes, Occupational Therapy Treatment  --  55  -LW     Therapy Frequency (OT Eval)  --  other (see comments) 5-7 days per week  -LW     Patient Effort  adequate  -TA  adequate  -LW     Existing Precautions/Restrictions  oxygen therapy device and L/min  -TA  oxygen therapy device and L/min  -LW     Equipment Issued to Patient  --  gait belt  -LW     Patient Response to Treatment  pt defered EOB/OOB  -TA  --     Recorded by [TA] Mary Jo Johnson PTA 07/04/19 1151 [LW] Marisela Medeiros COTA/DANDRE 07/04/19  1323     Row Name 07/04/19 1055 07/04/19 0805          Vital Signs    Pre Systolic BP Rehab  108  -TA  119  -LW     Pre Treatment Diastolic BP  58  -TA  62  -LW     Post Systolic BP Rehab  115  -TA  --     Post Treatment Diastolic BP  58  -TA  --     Pretreatment Heart Rate (beats/min)  79  -TA  77  -LW     Intratreatment Heart Rate (beats/min)  90  -TA  82  -LW     Posttreatment Heart Rate (beats/min)  91  -TA  79  -LW     Pre SpO2 (%)  93  -TA  94  -LW     O2 Delivery Pre Treatment  supplemental O2  -TA  supplemental O2  -LW     Intra SpO2 (%)  91  -TA  90  -LW     O2 Delivery Intra Treatment  supplemental O2  -TA  supplemental O2  -LW     Post SpO2 (%)  92  -TA  96  -LW     O2 Delivery Post Treatment  supplemental O2  -TA  supplemental O2  -LW     Pre Patient Position  Supine  -TA  Supine  -LW     Intra Patient Position  Supine  -TA  Supine  -LW     Post Patient Position  Supine  -TA  Sitting  -LW     Recorded by [TA] Mary Jo Johnson, PTA 07/04/19 1151 [LW] Marisela Medeiros COTA/L 07/04/19 1323     Row Name 07/04/19 1055 07/04/19 0805          Cognitive Assessment/Intervention- PT/OT    Affect/Mental Status (Cognitive)  WFL  -TA  WFL  -LW     Orientation Status (Cognition)  oriented x 4  -TA  oriented x 4  -LW     Follows Commands (Cognition)  WFL  -TA  WFL  -LW     Personal Safety Interventions  muscle strengthening facilitated  -TA  fall prevention program maintained;gait belt;nonskid shoes/slippers when out of bed  -LW     Recorded by [TA] Mary Jo Johnson, PTA 07/04/19 1151 [LW] Marisela Medeiros COTA/L 07/04/19 1323     Row Name 07/04/19 0805             Safety Issues, Functional Mobility    Impairments Affecting Function (Mobility)  shortness of breath  -LW      Recorded by [LW] Marisela Medeiros COTA/L 07/04/19 1323      Row Name 07/04/19 1055 07/04/19 0805          Bed Mobility Assessment/Treatment    Bed Mobility Assessment/Treatment  --  supine-sit;sit-supine  -LW     Supine-Sit [place] (Bed  Mobility)  not tested  -TA  independent  -LW     Sit-Supine [place] (Bed Mobility)  not tested  -TA  independent  -LW     Assistive Device (Bed Mobility)  --  bed rails  -LW     Recorded by [TA] Mary Jo Johnson, PTA 07/04/19 1151 [LW] Mariseal Medeiros COLÓN/L 07/04/19 1331     Row Name 07/04/19 0805             Functional Mobility    Functional Mobility- Ind. Level  independent  -LW      Functional Mobility- Device  -- No AD  -LW      Functional Mobility-Distance (Feet)  5  -LW      Functional Mobility- Safety Issues  supplemental O2  -LW      Recorded by [LW] Marisela Medeiros COLÓN/L 07/04/19 1331      Row Name 07/04/19 0805             Transfer Assessment/Treatment    Transfer Assessment/Treatment  sit-stand transfer;stand-sit transfer;toilet transfer  -LW      Recorded by [LW] Marisela Medeiros COLÓN/L 07/04/19 1331      Row Name 07/04/19 1055 07/04/19 0805          Sit-Stand Transfer    Sit-Stand [place] (Transfers)  not tested  -TA  independent  -LW     Assistive Device (Sit-Stand Transfers)  --  -- No AD  -LW     Recorded by [TA] Mary Jo Johnson, PTA 07/04/19 1151 [LW] Marisela Medeiros COLÓN/L 07/04/19 1331     Row Name 07/04/19 1055 07/04/19 0805          Stand-Sit Transfer    Stand-Sit [place] (Transfers)  not tested  -TA  independent  -LW     Assistive Device (Stand-Sit Transfers)  --  -- No AD  -LW     Recorded by [TA] Mary Jo Johnson, PTA 07/04/19 1151 [LW] Marisela Medeiros COLÓN/L 07/04/19 1331     Row Name 07/04/19 0805             Toilet Transfer    Type (Toilet Transfer)  sit-stand;stand-sit  -LW      [place] Level (Toilet Transfer)  independent  -LW      Assistive Device (Toilet Transfer)  commode, bedside with drop arms No AD  -LW      Recorded by [LW] Marisela Medeiros COLÓN/L 07/04/19 1331      Row Name 07/04/19 0805             ADL Assessment/Intervention    BADL Assessment/Intervention  bathing;upper body dressing;lower body dressing;grooming;toileting  -LW      Recorded by  [LW] Marisela Medeiros COTA/L 07/04/19 1331      Row Name 07/04/19 0805             Bathing Assessment/Intervention    Bathing [place] Level  lower body;upper body;conditional independence;set up  -LW      Assistive Devices (Bathing)  other (see comments) Pan bath  -LW      Bathing Position  edge of bed sitting  -LW      Recorded by [LW] Marisela Medeiros COTA/L 07/04/19 1331      Row Name 07/04/19 0805             Upper Body Dressing Assessment/Training    Upper Body Dressing [place] Level  doff;don;independent;other (see comments) Hospital gown  -LW      Upper Body Dressing Position  edge of bed sitting  -LW      Recorded by [LW] Marisela Medeiros COTA/L 07/04/19 1331      Row Name 07/04/19 0805             Lower Body Dressing Assessment/Training    Lower Body Dressing [place] Level  doff;don;socks;conditional independence  -LW      Lower Body Dressing Position  edge of bed sitting  -LW      Recorded by [LW] Marisela Medeiros COTA/L 07/04/19 1331      Row Name 07/04/19 0805             Grooming Assessment/Training    [place] Level (Grooming)  grooming skills;hair care, combing/brushing;oral care regimen;wash face, hands;independent  -LW      Grooming Position  edge of bed sitting  -LW      Recorded by [LW] Marisela Medeiros COTA/L 07/04/19 1331      Row Name 07/04/19 0805             Toileting Assessment/Training    [place] Level (Toileting)  toileting skills;adjust/manage clothing;perform perineal hygiene;independent  -LW      Assistive Devices (Toileting)  commode, bedside with drop arms No AD  -LW      Toileting Position  supported sitting  -LW      Recorded by [LW] Marisela Medeiros COTA/L 07/04/19 1331      Row Name 07/04/19 0805             BADL Safety/Performance    Impairments, BADL Safety/Performance  endurance/activity tolerance  -LW      Recorded by [LW] Marisela Medeiros COTA/L 07/04/19 1331      Row Name 07/04/19 1055             Therapeutic Exercise    Lower Extremity  (Therapeutic Exercise)  gluteal sets;heel slides, bilateral;quad sets, bilateral  -TA      Lower Extremity Range of Motion (Therapeutic Exercise)  hip abduction/adduction, bilateral;ankle dorsiflexion/plantar flexion, bilateral  -TA      Exercise Type (Therapeutic Exercise)  AROM (active range of motion)  -TA      Position (Therapeutic Exercise)  supine  -TA      Sets/Reps (Therapeutic Exercise)  20  -TA      Expected Outcome (Therapeutic Exercise)  facilitate normal movement patterns;improve functional stability;improve motor control;increase active range of motion  -TA      Recorded by [TA] Mary Jo Johnson, [facility] 07/04/19 1151      Row Name 07/04/19 1055 07/04/19 0805          Positioning and Restraints    Pre-Treatment Position  in bed  -TA  in bed  -LW     Post Treatment Position  bed  -TA  bed  -LW     In Bed  supine;call light within reach  -TA  supine;call light within reach;encouraged to call for assist  -LW     Recorded by [TA] Mary Jo Johnson, [facility] 07/04/19 1151 [LW] Marisela Medeiros COTA/L 07/04/19 1331     Row Name 07/04/19 1055 07/04/19 0805          Pain Scale: Numbers Pre/Post-Treatment    Pain Scale: Numbers, Pretreatment  0/10 - no pain  -TA  0/10 - no pain  -LW     Pain Scale: Numbers, Post-Treatment  0/10 - no pain  -TA  0/10 - no pain  -LW     Recorded by [TA] Mary Jo Johnson, [facility] 07/04/19 1151 [LW] Marisela Medeiros COTA/L 07/04/19 1331     Row Name 07/04/19 0805             Plan of Care Review    Plan of Care Reviewed With  patient  -LW      Recorded by [LW] Marisela Medeiros COTA/L 07/04/19 1331      Row Name 07/04/19 1055 07/04/19 0805          Outcome Summary/Treatment Plan (OT)    Daily Summary of Progress (OT)  --  progress toward functional goals as expected  -LW     Plan for Continued Treatment (OT)  --  Continue POC  -LW     Anticipated Discharge Disposition (OT)  anticipate therapy at next level of care  -TA  anticipate therapy at next level of care  -LW     Recorded by [TA] Alex  Mary Jo LÓPEZ, PTA 07/04/19 1151 [LW] Marisela Medeiros COTA/L 07/04/19 1331     Row Name 07/04/19 1055             Outcome Summary/Treatment Plan (PT)    Daily Summary of Progress (PT)  progress towards functional goals is fair  -TA      Plan for Continued Treatment (PT)  continue  -TA      Anticipated Discharge Disposition (PT)  anticipate therapy at next level of care;skilled nursing facility  -TA      Recorded by [TA] Mary Jo Johnson, PTA 07/04/19 1151        User Key  (r) = Recorded By, (t) = Taken By, (c) = Cosigned By    Initials Name Effective Dates Discipline    TA Mary Jo Johnson, PTA 03/07/18 -  PT    LW Marisela Medeiros COTA/L 03/07/18 -  OT        Wound Left posterior arm skin tear (Active)   Dressing Appearance open to air 7/3/2019  8:30 PM   Closure Approximated;Open to air 7/3/2019  8:30 PM   Base scab 7/3/2019  8:30 PM     Rehab Goal Summary     Row Name 07/04/19 1055 07/04/19 0805          Physical Therapy Goals    Bed Mobility Goal Selection (PT)  bed mobility, PT goal 1  -TA  --     Transfer Goal Selection (PT)  transfer, PT goal 1  -TA  --     Gait Training Goal Selection (PT)  gait training, PT goal 1  -TA  --        Bed Mobility Goal 1 (PT)    Activity/Assistive Device (Bed Mobility Goal 1, PT)  sit to supine/supine to sit  -TA  --     [place] Level/Cues Needed (Bed Mobility Goal 1, PT)  conditional independence  -TA  --     Time Frame (Bed Mobility Goal 1, PT)  long term goal (LTG);by discharge  -TA  --     Barriers (Bed Mobility Goal 1, PT)  HOB flat, no bed rails.   -TA  --     Progress/Outcomes (Bed Mobility Goal 1, PT)  goal partially met  -TA  --        Transfer Goal 1 (PT)    Activity/Assistive Device (Transfer Goal 1, PT)  sit-to-stand/stand-to-sit;bed-to-chair/chair-to-bed  -TA  --     [place] Level/Cues Needed (Transfer Goal 1, PT)  conditional independence  -TA  --     Time Frame (Transfer Goal 1, PT)  long term goal (LTG);by discharge  -TA  --     Barriers (Transfers Goal 1,  PT)  Maintain SpO2 >88%  -TA  --     Progress/Outcome (Transfer Goal 1, PT)  goal not met;goal ongoing  -TA  --        Gait Training Goal 1 (PT)    Activity/Assistive Device (Gait Training Goal 1, PT)  walker, rolling  -TA  --     [place] Level (Gait Training Goal 1, PT)  conditional independence  -TA  --     Distance (Gait Goal 1, PT)  25'x2  -TA  --     Time Frame (Gait Training Goal 1, PT)  long term goal (LTG);by discharge  -TA  --     Barriers (Gait Training Goal 1, PT)  Maintain SpO2 >88%  -TA  --     Progress/Outcome (Gait Training Goal 1, PT)  goal partially met;goal ongoing met distance; not met level of independence  -TA  --        Occupational Therapy Goals    Transfer Goal Selection (OT)  --  transfer, OT goal 1  -LW     Bathing Goal Selection (OT)  --  bathing, OT goal 1  -LW     Dressing Goal Selection (OT)  --  dressing, OT goal 1  -LW     Toileting Goal Selection (OT)  --  toileting, OT goal 1  -LW     Activity Tolerance Goal Selection (OT)  --  activity tolerance, OT goal 1  -LW        Transfer Goal 1 (OT)    Activity/Assistive Device (Transfer Goal 1, OT)  --  sit-to-stand/stand-to-sit;toilet;walker, rolling  -LW     [place] Level/Cues Needed (Transfer Goal 1, OT)  --  conditional independence  -LW     Time Frame (Transfer Goal 1, OT)  --  long term goal (LTG);by discharge  -LW     Progress/Outcome (Transfer Goal 1, OT)  --  goal met  (Significant)   -LW        Bathing Goal 1 (OT)    Activity/Assistive Device (Bathing Goal 1, OT)  --  bathing skills, all  -LW     [place] Level/Cues Needed (Bathing Goal 1, OT)  --  set-up required;supervision required  -LW     Time Frame (Bathing Goal 1, OT)  --  long term goal (LTG);by discharge  -LW     Progress/Outcomes (Bathing Goal 1, OT)  --  goal met  (Significant)   -LW        Dressing Goal 1 (OT)    Activity/Assistive Device (Dressing Goal 1, OT)  --  dressing skills, all  -LW     [place]/Cues Needed (Dressing Goal 1, OT)  --   conditional independence  -LW     Time Frame (Dressing Goal 1, OT)  --  long term goal (LTG);by discharge  -LW     Progress/Outcome (Dressing Goal 1, OT)  --  goal met  (Significant)   -LW        Toileting Goal 1 (OT)    Activity/Device (Toileting Goal 1, OT)  --  toileting skills, all  -LW     [place] Level/Cues Needed (Toileting Goal 1, OT)  --  conditional independence  -LW     Time Frame (Toileting Goal 1, OT)  --  long term goal (LTG);by discharge  -LW     Progress/Outcome (Toileting Goal 1, OT)  --  goal met  (Significant)   -LW         Activity Tolerance Goal 1 (OT)    Activity Tolerance Goal 1 (OT)  --  Functional Activity/ADL tasks  -LW     Activity Level (Endurance Goal 1, OT)  --  5 min activity;O2 sat >/ equal to 88%  -LW     Time Frame (Activity Tolerance Goal 1, OT)  --  long term goal (LTG)  -LW     Progress/Outcome (Activity Tolerance Goal 1, OT)  --  goal met  (Significant)   -LW       User Key  (r) = Recorded By, (t) = Taken By, (c) = Cosigned By    Initials Name Provider Type Discipline    TA Mary Jo Johnson, PTA Physical Therapy Assistant PT    Marisela Tafoya, ELDON/L Occupational Therapy Assistant OT        Occupational Therapy Education     Title: PT OT SLP Therapies (In Progress)     Topic: Occupational Therapy (Done)     Point: ADL training (Done)     Description: Instruct learner(s) on proper safety adaptation and remediation techniques during self care or transfers.   Instruct in proper use of assistive devices.    Learning Progress Summary           Patient Acceptance, E,TB, VU by DONG at 7/4/2019  1:34 PM    Acceptance, E,TB, VU by DONG at 7/3/2019 11:53 AM    Acceptance, E, VU by FRANCES at 7/1/2019  3:55 PM    Acceptance, E, VU by BB at 6/28/2019  2:44 PM    Acceptance, E,TB, VU by MR at 6/26/2019  1:41 PM    Comment:  Role of OT and POC, benefit of activity, PLB, EC/WS                   Point: Home exercise program (Done)     Description: Instruct learner(s) on appropriate technique  for monitoring, assisting and/or progressing therapeutic exercises/activities.    Learning Progress Summary           Patient Acceptance, E,TB, VU by LW at 7/4/2019  1:34 PM    Acceptance, E,TB, VU by LW at 7/3/2019 11:53 AM                   Point: Precautions (Done)     Description: Instruct learner(s) on prescribed precautions during self-care and functional transfers.    Learning Progress Summary           Patient Acceptance, E,TB, VU by LW at 7/4/2019  1:34 PM    Acceptance, E,TB, VU by LW at 7/3/2019 11:53 AM    Acceptance, E,TB, VU by MR at 6/26/2019  1:41 PM    Comment:  Role of OT and POC, benefit of activity, PLB, EC/WS                   Point: Body mechanics (Done)     Description: Instruct learner(s) on proper positioning and spine alignment during self-care, functional mobility activities and/or exercises.    Learning Progress Summary           Patient Acceptance, E,TB, VU by LW at 7/4/2019  1:34 PM    Acceptance, E,TB, VU by LW at 7/3/2019 11:53 AM    Acceptance, E, VU by BB at 7/1/2019  3:55 PM    Acceptance, E, VU by BB at 6/28/2019  2:44 PM                               User Key     Initials Effective Dates Name Provider Type Discipline     03/07/18 -  Selena Simpson COTA/L Occupational Therapy Assistant OT    LW 03/07/18 -  Marisela Medeiros COTA/L Occupational Therapy Assistant OT    MR 04/03/18 -  Daisy Duong, OT Occupational Therapist OT                OT Recommendation and Plan  Outcome Summary/Treatment Plan (OT)  Daily Summary of Progress (OT): progress toward functional goals as expected  Plan for Continued Treatment (OT): Continue POC  Anticipated Discharge Disposition (OT): anticipate therapy at next level of care  Therapy Frequency (OT Eval): other (see comments)(5-7 days per week)  Daily Summary of Progress (OT): progress toward functional goals as expected  Plan of Care Review  Plan of Care Reviewed With: patient  Plan of Care Reviewed With: patient  Outcome Summary: Pt  was able to work on ADL's with [place]. Pt met all goals.  Outcome Measures     Row Name 07/04/19 1100 07/04/19 0805 07/03/19 1100       How much help from another person do you currently need...    Turning from your back to your side while in flat bed without using bedrails?  4  -TA  --  --    Moving from lying on back to sitting on the side of a flat bed without bedrails?  4  -TA  --  --    Moving to and from a bed to a chair (including a wheelchair)?  3  -TA  --  --    Standing up from a chair using your arms (e.g., wheelchair, bedside chair)?  3  -TA  --  --    Climbing 3-5 steps with a railing?  3  -TA  --  --    To walk in hospital room?  3  -TA  --  --    AM-PAC 6 Clicks Score (PT)  20  -TA  --  --       How much help from another is currently needed...    Putting on and taking off regular lower body clothing?  --  4  -LW  3  -LW    Bathing (including washing, rinsing, and drying)  --  4  -LW  3  -LW    Toileting (which includes using toilet bed pan or urinal)  --  4  -LW  4  -LW    Putting on and taking off regular upper body clothing  --  4  -LW  3  -LW    Taking care of personal grooming (such as brushing teeth)  --  4  -LW  4  -LW    Eating meals  --  4  -LW  4  -LW    AM-PAC 6 Clicks Score (OT)  --  24  -LW  21  -LW       Functional Assessment    Outcome Measure Options  AM-PAC 6 Clicks Basic Mobility (PT)  -TA  --  --    Row Name 07/03/19 0922 07/02/19 1600 07/01/19 1448       How much help from another person do you currently need...    Turning from your back to your side while in flat bed without using bedrails?  4  -TW  4  -TA  --    Moving from lying on back to sitting on the side of a flat bed without bedrails?  4  -TW  4  -TA  --    Moving to and from a bed to a chair (including a wheelchair)?  3  -TW  3  -TA  --    Standing up from a chair using your arms (e.g., wheelchair, bedside chair)?  3  -TW  3  -TA  --    Climbing 3-5 steps with a railing?  3  -TW  3  -TA  --    To walk in hospital  room?  3  -TW  3  -TA  --    AM-PAC 6 Clicks Score (PT)  20  -TW  20  -TA  --       How much help from another is currently needed...    Putting on and taking off regular lower body clothing?  --  --  3  -BB    Bathing (including washing, rinsing, and drying)  --  --  3  -BB    Toileting (which includes using toilet bed pan or urinal)  --  --  3  -BB    Putting on and taking off regular upper body clothing  --  --  3  -BB    Taking care of personal grooming (such as brushing teeth)  --  --  4  -BB    Eating meals  --  --  4  -BB    AM-PAC 6 Clicks Score (OT)  --  --  20  -BB       Functional Assessment    Outcome Measure Options  AM-PAC 6 Clicks Basic Mobility (PT)  -TW  AM-PAC 6 Clicks Basic Mobility (PT)  -TA  --      User Key  (r) = Recorded By, (t) = Taken By, (c) = Cosigned By    Initials Name Provider Type    TA Mary Jo Johnson, YOSEPH Physical Therapy Assistant    TW Reece Rocha, YOSEPH Physical Therapy Assistant    BB Selena Simpson COTA/L Occupational Therapy Assistant    Marisela Tafoya COTA/L Occupational Therapy Assistant           Time Calculation:   Time Calculation- OT     Row Name 07/04/19 1336             Time Calculation- OT    OT Start Time  0805  -LW      OT Stop Time  0900  -LW      OT Time Calculation (min)  55 min  -LW      Total Timed Code Minutes- OT  55 minute(s)  -LW      OT Received On  07/04/19  -        User Key  (r) = Recorded By, (t) = Taken By, (c) = Cosigned By    Initials Name Provider Type    Marisela Tafoya COTA/L Occupational Therapy Assistant        Therapy Charges for Today     Code Description Service Date Service Provider Modifiers Qty    66665919337 HC OT SELF CARE/MGMT/TRAIN EA 15 MIN 7/3/2019 Marisela Medeiros COTA/L GO 2    [id number] HC OT SELF CARE/MGMT/TRAIN EA 15 MIN 7/4/2019 Marisela Medeiros COTA/L GO 4      Non-skid socks and gait belt in place. Toileting offered. Call light and needs within reach. Pt advised to not get up alone and call the  nurse for assistance.  Bed alarm on.          YSABEL Mejia  7/4/2019   no

## 2021-07-29 NOTE — DISCHARGE NOTE PROVIDER - NSDCMRMEDTOKEN_GEN_ALL_CORE_FT
aspirin 81 mg oral tablet: orally once a day--last dose 7/21/2021  CeleBREX 200 mg oral capsule: orally once a day, As Needed  lisinopril 20 mg oral tablet: 1 tab(s) orally once a day  Multiple Vitamins oral tablet: 1 tab(s) orally once a day  NIFEdipine 60 mg oral tablet, extended release: 1 tab(s) orally once a day  simvastatin 40 mg oral tablet: 1 tab(s) orally once a day (at bedtime)   aspirin 81 mg oral tablet: orally once a day--last dose 7/21/2021; STOP while taking blood clot prevention medication course, RESUME when course complete  CeleBREX 200 mg oral capsule: orally once a day, As Needed  lisinopril 20 mg oral tablet: 1 tab(s) orally once a day  MiraLax oral powder for reconstitution: 17 gram(s) orally once a day  as needed for constipation  Multiple Vitamins oral tablet: 1 tab(s) orally once a day  NIFEdipine 60 mg oral tablet, extended release: 1 tab(s) orally once a day  oxyCODONE 5 mg oral tablet: 1 tab(s) orally every 4 hours as needed for severe pain; MDD:6  pantoprazole 40 mg oral delayed release tablet: 1 tab(s) orally once a day   senna oral tablet: 2 tab(s) orally once a day (at bedtime)   simvastatin 40 mg oral tablet: 1 tab(s) orally once a day (at bedtime)  Tylenol Extra Strength 500 mg oral tablet: 2 tab(s) orally 3 times a day    aspirin 81 mg oral tablet: 325 enteric coated milligram(s) orally once a day ; STOP 81mg while taking blood clot prevention medication course, RESUME 81 when course of 325mg completed in 10 days  on 8-8-2021   CeleBREX 200 mg oral capsule: orally once a day, As Needed  lisinopril 20 mg oral tablet: 1 tab(s) orally once a day  MiraLax oral powder for reconstitution: 17 gram(s) orally once a day  as needed for constipation  Multiple Vitamins oral tablet: 1 tab(s) orally once a day  NIFEdipine 60 mg oral tablet, extended release: 1 tab(s) orally once a day  oxyCODONE 5 mg oral tablet: 1 tab(s) orally every 4 hours as needed for severe pain; MDD:6  pantoprazole 40 mg oral delayed release tablet: 1 tab(s) orally once a day   senna oral tablet: 2 tab(s) orally once a day (at bedtime)   simvastatin 40 mg oral tablet: 1 tab(s) orally once a day (at bedtime)  Tylenol Extra Strength 500 mg oral tablet: 2 tab(s) orally 3 times a day

## 2021-07-29 NOTE — PROGRESS NOTE ADULT - ASSESSMENT
A: 76M s/p Left TSA    P;  no extensoin , FUll FF, IR chest wall , ER 20 degrees  thearpy   sling prn   pain control   follow labs   DVT ppx   post op abx   venodynes  incentive spirometer

## 2021-07-29 NOTE — PROGRESS NOTE ADULT - SUBJECTIVE AND OBJECTIVE BOX
pt seen at bedside comfortable in NAD, min pain left shoulder, denies N/T LUE no other complaints    PE left shoulder  FROM wrist and fingers  M/R/U nerves intact   no sensation axillary patch   SILT distally   cap refill brisk   radial pulse 2+

## 2021-07-29 NOTE — CONSULT NOTE ADULT - ASSESSMENT
This is a 76 year old male s/p left total shoulder replacement with moderate risk for VTE due to age, impaired mobility, BMI, and surgery He is low risk for bleeding. Recommend Lovenox in hospital with possibility for ECASA upon discharge pending mobility with PT. Will reassess POD #1    Plan:  ::Lovenox 40 mg SQ daily x 7-10 days with possibility to dc home on ECASA pending mobility status/limitations  ::Daily CBC/BMP  ::Venodynes b/l  ::Amb/mobility per PT    Thank you for this consult, will continue to follow.  Dispo: Home

## 2021-07-29 NOTE — DISCHARGE NOTE PROVIDER - HOSPITAL COURSE
H&P:  Pt is a 76y Male    PAST MEDICAL & SURGICAL HISTORY:  Arthritis    HTN (hypertension)    Hyperlipidemia    COVID-19 vaccine series completed  Moderna--completed 3/4/2021    H/O inguinal hernia repair  Left ---6/2002    H/O colonoscopy  2/2021         Now s/p Left Total Shoulder Arthroplasty. Pt is afebrile with stable vital signs. Pain is controlled. Alert and Oriented. Exam reveals intact AIN/PIN, wrist flexion and wrist extension, 2+Radial Pulse. Dressing is clean and dry with telfa/tegaderm on.    Hospital Course:  Patient presented to [facility] medically cleared for Left Total Shoulder Replacement Surgery, having failed outpatient non-operative conservative management. Prophylactic antibiotics were started before the procedure and continued for 24 hours. They were admitted after surgery to the orthopedic floor.   There were no complications during the hospital stay.     Routine consults were obtained from the Anticoagulation Team for DVT/PE prophylaxis, from Physical Therapy for twice daily PT ROM limited to full forward elevation, Internal rotation to Chest, external rotation to neutral, no extension. Consult to Hospitalist for Medical Co-management. Patient was placed on anticoagulation post-op.  Pertinent home medications were continued.  Daily labs were followed.      On POD 0 there were no overnight events and the pt was OOB with PT. POD 1, PT was continued, and anticipate POD 1 DC to home. The orthopedic Attending is aware and agrees. H&P:  Pt is a 76y Male    PAST MEDICAL & SURGICAL HISTORY:  Arthritis    HTN (hypertension)    Hyperlipidemia    COVID-19 vaccine series completed  Moderna--completed 3/4/2021    H/O inguinal hernia repair  Left ---6/2002    H/O colonoscopy  2/2021         Now s/p Left Total Shoulder Arthroplasty. Pt is afebrile with stable vital signs. Pain is controlled. Alert and Oriented. Exam reveals intact AIN/PIN, wrist flexion and wrist extension, 2+Radial Pulse. Dressing is clean and dry with telfa/tegaderm on.    Hospital Course:  Patient presented to [facility] medically cleared for Left Total Shoulder Replacement Surgery, having failed outpatient non-operative conservative management. Prophylactic antibiotics were started before the procedure and continued for 24 hours. They were admitted after surgery to the orthopedic floor.   There were no complications during the hospital stay.     Routine consults were obtained from the Anticoagulation Team for DVT/PE prophylaxis, from Physical Therapy for twice daily PT ROM limited to full forward elevation, Internal rotation to Chest, external rotation to neutral, no extension. Consult to Hospitalist for Medical Co-management. Patient was placed on anticoagulation post-op.  Pertinent home medications were continued.  Daily labs were followed.      On POD 0 there were no overnight events and the pt was OOB with PT. POD 1, PT was continued, and POD 1 DC to home. The orthopedic Attending is aware and agrees.

## 2021-07-29 NOTE — CONSULT NOTE ADULT - SUBJECTIVE AND OBJECTIVE BOX
HPI:  Patient is a 76y old  Male who presents with a chief complaint of left shoulder pain now s/p left total shoulder replacement 21.    Consulted by Dr. Paulson for VTE prophylaxis, risk stratification, and anticoagulation management.    PAST MEDICAL & SURGICAL HISTORY:  Arthritis    HTN (hypertension)    Hyperlipidemia    COVID-19 vaccine series completed  Moderna--completed 3/4/2021    H/O inguinal hernia repair  Left ---2002    H/O colonoscopy  2021    Interval History:  21: Patient seen at bedside in PACU, awake and alert and able to communicate effectively. He denies any pain at this time reporting some numbness still in left arm. He is able to squeeze my hand but still with numbness in fingers proximally to forearm/bicep. Denies any history of bleeding or clotting. Explained my role in VTE prophylaxis and necessity for Lovenox injections for about 7-10 days post-op pending his mobility upon discharge. Patient is amenable but will need reinforcement and education.    BMI: 30.3    CrCl: pending labs    Incision Time: 1106  Procedure End Time:1240  EBL:50ml    Caprini VTE Risk Score:  CAPRINI SCORE  AGE RELATED RISK FACTORS                                                       MOBILITY RELATED FACTORS  [ ] Age 41-60 years                                            (1 Point)                  [ ] Bed rest /restricted mobility                      (1 Point)  [ ] Age: 61-74 years                                           (2 Points)                [ ] Plaster cast                                                   (2 Points)  [x ] Age= 75 years                                              (3 Points)                 [ ] Bed bound for more than 72 hours                   (2 Points)    DISEASE RELATED RISK FACTORS                                               GENDER SPECIFIC FACTORS  [ ] Edema in the lower extremities                       (1 Point)           [ ] Pregnancy                                                            (1 Point)  [ ] Varicose veins                                               (1 Point)                  [ ] Post-partum < 6 weeks                                      (1 Point)             [x ] BMI > 25 Kg/m2                                            (1 Point)                  [ ] Hormonal therapy or oral contraception       (1 Point)                 [ ] Sepsis (in the previous month)                        (1 Point)             [ ] History of pregnancy complications                (1Point)  [ ] Pneumonia or serious lung disease                                             [ ] Unexplained or recurrent  (=/>3), premature                                 (In the previous month)                               (1 Point)                birth with toxemia or growth-restricted infant (1 Point)  [ ] Abnormal pulmonary function test            (1 Point)                                   SURGERY RELATED RISK FACTORS  [ ] Acute myocardial infarction                       (1 Point)                  [ ]  Section                                         (1 Point)  [ ] Congestive heart failure (in the previous month) (1 Point)   [ ] Minor surgery   lasting <45 minutes       (1 Point)   [ ] Inflammatory bowel disease                             (1 Point)          [ ] Arthroscopic surgery                                  (2 Points)  [ ] Central venous access                                    (2 Points)            [ x] General surgery lasting >45 minutes      (2 Points)       [ ] Stroke (in the previous month)                  (5 Points)            [ ] Elective major lower extremity arthroplasty (5 Points)                                   [  ] Malignancy (present or past include skin melanoma                                          but exclude  basal skin cell)    (2 points)                                      TRAUMA RELATED RISK FACTORS                HEMATOLOGY RELATED FACTORS                                  [ ] Fracture of the hip, pelvis, or leg                       (5 Points)  [ ] Prior episodes of VTE                                     (3 Points)          [ ] Acute spinal cord injury (in the previous month)  (5 Points)  [ ] Positive family history for VTE                         (3 Points)       [ ] Paralysis (less than 1 month)                          (5 Points)  [ ] Prothrombin 15301 A                                      (3 Points)         [ ] Multiple Trauma (within 1month)                 (5Points)                                                                                                                                                                [ ] Factor V Leiden                                          (3 Points)                                OTHER RISK FACTORS                          [ ] Lupus anticoagulants                                     (3 Points)                     [ ] BMI > 40                          (1 Point)                                                         [ ] Anticardiolipin antibodies                                (3 Points)                 [ ] Smoking                              (1Point)                                                [ ] High homocysteine in the blood                      (3 Points)                [  ] Diabetes requiring insulin (1point)                         [ ] Other congenital or acquired thrombophilia       (3 Points)          [  ] Chemotherapy                   (1 Point)  [ ] Heparin induced thrombocytopenia                  (3 Points)             [  ] Blood Transfusion                (1 point)                                                                                                             Total Score [      6    ]                                                                                                                                                                                                                                                                                                                                                                                                                                       IMPROVE Bleeding Risk Score:2.5      Falls Risk:   High ( x )  Mod (  )  Low (  )      FAMILY HISTORY:  FH: heart disease  Mother, , age 75    FH: stroke  Father, , age 87;;;also colon cancer      Denies any personal or familial history of clotting or bleeding disorders.    Allergies    No Known Allergies    Intolerances    REVIEW OF SYSTEMS    (  )Fever	        (  )Constipation	(  )SOB				  (  )Headache   (  )Dysuria  (  )Chills	        (  )Melena	        (  )Dyspnea on exertion (  )Dizziness    (  )Polyuria  (  )Nausea      (  )Hematochezia	(  )Cough                          (  )Syncope      (  )Hematuria  (  )Vomiting   (  )Chest Pain	        (  )Wheezing			  (  )Weakness  (  )Diarrhea    (  )Palpitations	(  )Anorexia			  ( x )Myalgia       ( x  ) Arthralgia    Pertinent positives in HPI and daily subjective. All other systems negative.    Vital Signs Last 24 Hrs  T(C): 36.1 (21 @ 13:15), Max: 36.4 (21 @ 08:55)  T(F): 97 (21 @ 13:15), Max: 97.5 (21 @ 08:55)  HR: 87 (21 @ 13:15) (85 - 90)  BP: 111/64 (21 @ 13:15) (106/69 - 122/80)  BP(mean): --  RR: 18 (21 @ 13:15) (16 - 22)  SpO2: 100% (21 @ 13:15) (97% - 100%)      PHYSICAL EXAM:    Constitutional: Appears Well    Neurological: A& O x 3    Skin: Warm    Respiratory and Thorax: normal effort; Breath sounds: normal; No rales/wheezing/rhonchi  	  Cardiovascular: S1, S2, regular, NMBR	    Gastrointestinal: BS + x 4Q, nontender	    Genitourinary:  Bladder nondistended, nontender    Musculoskeletal:   General Right:   no muscle/joint tenderness,   normal tone, no joint swelling,   ROM: full	    General Left:   +muscle/joint tenderness,   normal tone, no joint swelling,   ROM: limited    Shoulder:  Lt: Drsing CDI; Sling noted; Cap refill good; Radial pulse +; Sensation intact    Lower extrems:   Right: no calf tenderness              negative rekha's sign               + pedal pulses    Left:   no calf tenderness              negative rekha's sign               + pedal pulses    			    MEDICATIONS  (STANDING):  acetaminophen   Tablet .. 650 milliGRAM(s) Oral every 6 hours  ceFAZolin   IVPB 2000 milliGRAM(s) IV Intermittent every 8 hours  celecoxib 200 milliGRAM(s) Oral every 12 hours  enoxaparin Injectable 40 milliGRAM(s) SubCutaneous once  lactated ringers. 1000 milliLiter(s) IV Continuous <Continuous>  lactated ringers. 1000 milliLiter(s) IV Continuous <Continuous>  pantoprazole    Tablet 40 milliGRAM(s) Oral before breakfast  polyethylene glycol 3350 17 Gram(s) Oral at bedtime  senna 2 Tablet(s) Oral at bedtime            **Current DVT Prophylaxis:    LMWH                   ( x )  Heparin SQ           (  )  Coumadin             (  )  Xarelto                  (  )  Eliquis                   (  )  Venodynes           ( x )  Ambulation          ( x )  UFH                       (  )  ECASA                   (  )  Contraindicated  (  )          
  PCP: Dr. Juan Dennison    CHIEF COMPLAINT:  inc left shoulder pain, H/o OA    HISTORY OF THE PRESENT ILLNESS: this is a 75 yo male with PMH of HTN, HLD, aortic dilatation ~4.0 cm, CKD2, BPV, carotid disease, CAD, valvular disease and OA of his left shoulder  with progressive pain with ROM, with noted crepitus on exam.  He reports he only had temporary relief from cortisone injection, he feels that the pain is now affecting his quality of life and has opted for surgical intervention.  Pt is seen on orth, IN NAD, denies any CP or SOB.  left arm in sling no pain 2/2 to block. We are consulted for medical management    PAST MEDICAL HISTORY: as above    PAST SURGICAL HISTORY: inguinal hernia repair, tonsillectomy, upper Endo    FAMILY HISTORY:   mother dec age 75: MI, Father dec age 75: CVA    SOCIAL HISTORY:  former smoker, quit 42 years ago, social alcohol, no drugs    ALLERGIES: NKDA    HOME MEDS: see med rec    REVIEW OF SYSTEMS:   All 10 systems reviewed in detailed and found to be negative with the exception of what has already been described above    MEDICATIONS  (STANDING):  acetaminophen   Tablet .. 650 milliGRAM(s) Oral every 6 hours  ceFAZolin   IVPB 2000 milliGRAM(s) IV Intermittent every 8 hours  celecoxib 200 milliGRAM(s) Oral every 12 hours  enoxaparin Injectable 40 milliGRAM(s) SubCutaneous once  lactated ringers. 1000 milliLiter(s) (75 mL/Hr) IV Continuous <Continuous>  lisinopril 20 milliGRAM(s) Oral daily  NIFEdipine XL 60 milliGRAM(s) Oral daily  pantoprazole    Tablet 40 milliGRAM(s) Oral before breakfast  polyethylene glycol 3350 17 Gram(s) Oral at bedtime  senna 2 Tablet(s) Oral at bedtime  simvastatin 40 milliGRAM(s) Oral at bedtime    MEDICATIONS  (PRN):  HYDROmorphone  Injectable 0.5 milliGRAM(s) IV Push once PRN Breakthrough Pain  magnesium hydroxide Suspension 30 milliLiter(s) Oral daily PRN Constipation  ondansetron Injectable 8 milliGRAM(s) IV Push every 8 hours PRN Nausea and/or Vomiting  oxyCODONE    IR 5 milliGRAM(s) Oral every 3 hours PRN Moderate Pain (4 - 6)  oxyCODONE    IR 10 milliGRAM(s) Oral every 3 hours PRN Severe Pain (7 - 10)  prochlorperazine   Injectable 5 milliGRAM(s) IV Push once PRN Nausea and/or Vomiting  traMADol 50 milliGRAM(s) Oral every 4 hours PRN Mild Pain (1 - 3)      VITALS:  T(F): 98 (07-29-21 @ 14:15), Max: 98 (07-29-21 @ 14:15)  HR: 85 (07-29-21 @ 14:15) (84 - 90)  BP: 115/69 (07-29-21 @ 14:15) (106/69 - 122/80)  RR: 17 (07-29-21 @ 14:15) (15 - 22)  SpO2: 98% (07-29-21 @ 14:15) (97% - 100%)  Wt(kg): --    I&O's Summary    29 Jul 2021 07:01  -  29 Jul 2021 14:40  --------------------------------------------------------  IN: 1094 mL / OUT: 0 mL / NET: 1094 mL        CAPILLARY BLOOD GLUCOSE      POCT Blood Glucose.: 108 mg/dL (29 Jul 2021 12:56)  POCT Blood Glucose.: 96 mg/dL (29 Jul 2021 09:12)    PHYSICAL EXAM:    GENERAL: Comfortable, no acute distress   HEAD:  Normocephalic, atraumatic  EYES: EOMI, PERRLA  HEENT: Moist mucous membranes  NECK: Supple, No JVD  NERVOUS SYSTEM:  Alert & Oriented X3, Motor Strength 5/5 B/L upper and lower extremities  CHEST/LUNG: Clear to auscultation bilaterally  HEART: Regular rate and rhythm  ABDOMEN: Soft, non tender, Nondistended, Bowel sounds present  GENITOURINARY: Voiding, no palpable bladder  EXTREMITIES:   No clubbing, cyanosis, or edema  MUSCULOSKELETAL- left shoulder dressing c/d/i, sling in place  SKIN-no rash            LABS:                            13.2   8.84  )-----------( 201      ( 29 Jul 2021 13:51 )             38.6     07-29    141  |  109<H>  |  17  ----------------------------<  106<H>  4.0   |  27  |  0.77    Ca    8.1<L>      29 Jul 2021 13:51          IMPRESSION: 75 yo male with above pmh a/w:  # left shoulder OA  # S/P left TSR    POD#0  pain control  PT eval  Bowel regimen  IVF's  Finish ABXs  DASH diet  PPI  VTE prophylaxis  monitor CBC/BMP      # HTN/CAD  cont ACE/CCB/ASA    # HLD  cont statin    # Vte prophylaxis  AC consult appreciated  lovenox while hospitalized---> EC ASA on d/c home  Venodynes  INC mobility      Thank you for the consult, will follow

## 2021-07-29 NOTE — DISCHARGE NOTE PROVIDER - CARE PROVIDER_API CALL
Eric Paulson)  Orthopaedic Surgery  [address] [place] [address] [place] [address]  Phone: [phone]  Fax: [phone]  Follow Up Time:

## 2021-07-30 ENCOUNTER — TRANSCRIPTION ENCOUNTER (OUTPATIENT)
Age: 76
End: 2021-07-30

## 2021-07-30 VITALS
SYSTOLIC BLOOD PRESSURE: 137 MMHG | TEMPERATURE: 98 F | DIASTOLIC BLOOD PRESSURE: 72 MMHG | OXYGEN SATURATION: 95 % | HEART RATE: 89 BPM | RESPIRATION RATE: 18 BRPM

## 2021-07-30 LAB
ANION GAP SERPL CALC-SCNC: 4 MMOL/L — LOW (ref 5–17)
BUN SERPL-MCNC: 15 MG/DL — SIGNIFICANT CHANGE UP (ref 7–23)
CALCIUM SERPL-MCNC: 7.8 MG/DL — LOW (ref 8.5–10.1)
CHLORIDE SERPL-SCNC: 108 MMOL/L — SIGNIFICANT CHANGE UP (ref 96–108)
CO2 SERPL-SCNC: 28 MMOL/L — SIGNIFICANT CHANGE UP (ref 22–31)
COVID-19 SPIKE DOMAIN AB INTERP: POSITIVE
COVID-19 SPIKE DOMAIN ANTIBODY RESULT: 193 U/ML — HIGH
CREAT SERPL-MCNC: 0.74 MG/DL — SIGNIFICANT CHANGE UP (ref 0.5–1.3)
GLUCOSE SERPL-MCNC: 134 MG/DL — HIGH (ref 70–99)
HCT VFR BLD CALC: 37.2 % — LOW (ref 39–50)
HGB BLD-MCNC: 12.7 G/DL — LOW (ref 13–17)
MCHC RBC-ENTMCNC: 33 PG — SIGNIFICANT CHANGE UP (ref 27–34)
MCHC RBC-ENTMCNC: 34.1 GM/DL — SIGNIFICANT CHANGE UP (ref 32–36)
MCV RBC AUTO: 96.6 FL — SIGNIFICANT CHANGE UP (ref 80–100)
PLATELET # BLD AUTO: 189 K/UL — SIGNIFICANT CHANGE UP (ref 150–400)
POTASSIUM SERPL-MCNC: 4.2 MMOL/L — SIGNIFICANT CHANGE UP (ref 3.5–5.3)
POTASSIUM SERPL-SCNC: 4.2 MMOL/L — SIGNIFICANT CHANGE UP (ref 3.5–5.3)
RBC # BLD: 3.85 M/UL — LOW (ref 4.2–5.8)
RBC # FLD: 13.6 % — SIGNIFICANT CHANGE UP (ref 10.3–14.5)
SARS-COV-2 IGG+IGM SERPL QL IA: 193 U/ML — HIGH
SARS-COV-2 IGG+IGM SERPL QL IA: POSITIVE
SODIUM SERPL-SCNC: 140 MMOL/L — SIGNIFICANT CHANGE UP (ref 135–145)
WBC # BLD: 7 K/UL — SIGNIFICANT CHANGE UP (ref 3.8–10.5)
WBC # FLD AUTO: 7 K/UL — SIGNIFICANT CHANGE UP (ref 3.8–10.5)

## 2021-07-30 PROCEDURE — 99232 SBSQ HOSP IP/OBS MODERATE 35: CPT

## 2021-07-30 PROCEDURE — 99231 SBSQ HOSP IP/OBS SF/LOW 25: CPT

## 2021-07-30 RX ORDER — ASPIRIN/CALCIUM CARB/MAGNESIUM 324 MG
1 TABLET ORAL
Qty: 10 | Refills: 0
Start: 2021-07-30 | End: 2021-08-08

## 2021-07-30 RX ORDER — ASPIRIN/CALCIUM CARB/MAGNESIUM 324 MG
325 TABLET ORAL
Qty: 0 | Refills: 0 | DISCHARGE

## 2021-07-30 RX ORDER — ASPIRIN/CALCIUM CARB/MAGNESIUM 324 MG
325 TABLET ORAL DAILY
Refills: 0 | Status: DISCONTINUED | OUTPATIENT
Start: 2021-07-30 | End: 2021-07-30

## 2021-07-30 RX ORDER — ASPIRIN/CALCIUM CARB/MAGNESIUM 324 MG
0 TABLET ORAL
Qty: 0 | Refills: 0 | DISCHARGE

## 2021-07-30 RX ADMIN — Medication 650 MILLIGRAM(S): at 12:01

## 2021-07-30 RX ADMIN — LISINOPRIL 20 MILLIGRAM(S): 2.5 TABLET ORAL at 08:45

## 2021-07-30 RX ADMIN — Medication 101.6 MILLIGRAM(S): at 06:23

## 2021-07-30 RX ADMIN — CELECOXIB 200 MILLIGRAM(S): 200 CAPSULE ORAL at 08:46

## 2021-07-30 RX ADMIN — Medication 1 DROP(S): at 05:34

## 2021-07-30 RX ADMIN — CELECOXIB 200 MILLIGRAM(S): 200 CAPSULE ORAL at 08:45

## 2021-07-30 RX ADMIN — Medication 650 MILLIGRAM(S): at 05:35

## 2021-07-30 RX ADMIN — OXYCODONE HYDROCHLORIDE 10 MILLIGRAM(S): 5 TABLET ORAL at 09:15

## 2021-07-30 RX ADMIN — Medication 1 DROP(S): at 01:28

## 2021-07-30 RX ADMIN — SODIUM CHLORIDE 75 MILLILITER(S): 9 INJECTION, SOLUTION INTRAVENOUS at 01:46

## 2021-07-30 RX ADMIN — PANTOPRAZOLE SODIUM 40 MILLIGRAM(S): 20 TABLET, DELAYED RELEASE ORAL at 05:34

## 2021-07-30 RX ADMIN — Medication 325 MILLIGRAM(S): at 12:01

## 2021-07-30 RX ADMIN — OXYCODONE HYDROCHLORIDE 10 MILLIGRAM(S): 5 TABLET ORAL at 06:04

## 2021-07-30 RX ADMIN — Medication 1 DROP(S): at 08:46

## 2021-07-30 RX ADMIN — OXYCODONE HYDROCHLORIDE 10 MILLIGRAM(S): 5 TABLET ORAL at 01:58

## 2021-07-30 RX ADMIN — Medication 100 MILLIGRAM(S): at 01:46

## 2021-07-30 RX ADMIN — OXYCODONE HYDROCHLORIDE 10 MILLIGRAM(S): 5 TABLET ORAL at 08:41

## 2021-07-30 RX ADMIN — OXYCODONE HYDROCHLORIDE 10 MILLIGRAM(S): 5 TABLET ORAL at 05:34

## 2021-07-30 RX ADMIN — OXYCODONE HYDROCHLORIDE 10 MILLIGRAM(S): 5 TABLET ORAL at 01:28

## 2021-07-30 RX ADMIN — Medication 650 MILLIGRAM(S): at 05:34

## 2021-07-30 NOTE — PROGRESS NOTE ADULT - ASSESSMENT
A: 76M s/p Left TSA POD #1    P;  PT parameters: no extension , Full FF, IR chest wall , ER 20 degrees  therapy  sling   pain control   follow labs   DVT ppx   post op abx   venodynes  incentive spirometer  dispo planning

## 2021-07-30 NOTE — PHYSICAL THERAPY INITIAL EVALUATION ADULT - PLANNED THERAPY INTERVENTIONS, PT EVAL
NO Further Acute Care PT needs at this time as pt is already ambulating at baseline independence; Safe to ambulate freely; NOT a Fall Risk; Will d/c PT at this time; Safe to go home

## 2021-07-30 NOTE — PHYSICAL THERAPY INITIAL EVALUATION ADULT - ACTIVE RANGE OF MOTION EXAMINATION, REHAB EVAL
Except LUE Not Tested due to Immobilization in sling/bilateral upper extremity Active ROM was WFL (within functional limits)/bilateral  lower extremity Active ROM was WFL (within functional limits)

## 2021-07-30 NOTE — PROGRESS NOTE ADULT - ASSESSMENT
This is a 76 year old male s/p left total shoulder replacement with moderate risk for VTE due to age, impaired mobility, BMI, and surgery He is low risk for bleeding. Recommend Lovenox in hospital with possibility for ECASA upon discharge pending mobility with PT. Will reassess POD #1    Plan:  :: dc Lovenox   :  [id number] po daily for 10 days.  ::Gila b/l  ::Amb/mobility per PT    , will sign off case please reconsult if needed.   Dispo: Home

## 2021-07-30 NOTE — PROGRESS NOTE ADULT - ATTENDING COMMENTS
Pt seen and examined this am with odilon davila. agree with documnetation as above with changes made where appropriate.   Pt admitted for left shoulder replacement.   doing very well.   ambulating down hallway without issue.   For dc home today.

## 2021-07-30 NOTE — PROGRESS NOTE ADULT - SUBJECTIVE AND OBJECTIVE BOX
HPI:  Patient is a 76y old  Male who presents with a chief complaint of left shoulder pain now s/p left total shoulder replacement 21.    Consulted by Dr. Paulson for VTE prophylaxis, risk stratification, and anticoagulation management.    PAST MEDICAL & SURGICAL HISTORY:  Arthritis    HTN (hypertension)    Hyperlipidemia    COVID-19 vaccine series completed  Moderna--completed 3/4/2021    H/O inguinal hernia repair  Left ---2002    H/O colonoscopy  2021    Interval History:  21: Patient seen at bedside in PACU, awake and alert and able to communicate effectively. He denies any pain at this time reporting some numbness still in left arm. He is able to squeeze my hand but still with numbness in fingers proximally to forearm/bicep. Denies any history of bleeding or clotting. Explained my role in VTE prophylaxis and necessity for Lovenox injections for about 7-10 days post-op pending his mobility upon discharge. Patient is amenable but will need reinforcement and education.  2021 Pt seen at bedside on 2north oob in chair.  States he is walking around with no concerns.  Discussed his anticoagulation with an aspirin 325mg enteric coated once a day for 10 days.  Will stop his 81mg aspirin while on the 35mg.  No concerns.  Understands the risks/benefits.   BMI: 30.3    CrCl: pending labs    Incision Time: 1106  Procedure End Time:1240  EBL:50ml    Caprini VTE Risk Score:  CAPRINI SCORE  AGE RELATED RISK FACTORS                                                       MOBILITY RELATED FACTORS  [ ] Age 41-60 years                                            (1 Point)                  [ ] Bed rest /restricted mobility                      (1 Point)  [ ] Age: 61-74 years                                           (2 Points)                [ ] Plaster cast                                                   (2 Points)  [x ] Age= 75 years                                              (3 Points)                 [ ] Bed bound for more than 72 hours                   (2 Points)    DISEASE RELATED RISK FACTORS                                               GENDER SPECIFIC FACTORS  [ ] Edema in the lower extremities                       (1 Point)           [ ] Pregnancy                                                            (1 Point)  [ ] Varicose veins                                               (1 Point)                  [ ] Post-partum < 6 weeks                                      (1 Point)             [x ] BMI > 25 Kg/m2                                            (1 Point)                  [ ] Hormonal therapy or oral contraception       (1 Point)                 [ ] Sepsis (in the previous month)                        (1 Point)             [ ] History of pregnancy complications                (1Point)  [ ] Pneumonia or serious lung disease                                             [ ] Unexplained or recurrent  (=/>3), premature                                 (In the previous month)                               (1 Point)                birth with toxemia or growth-restricted infant (1 Point)  [ ] Abnormal pulmonary function test            (1 Point)                                   SURGERY RELATED RISK FACTORS  [ ] Acute myocardial infarction                       (1 Point)                  [ ]  Section                                         (1 Point)  [ ] Congestive heart failure (in the previous month) (1 Point)   [ ] Minor surgery   lasting <45 minutes       (1 Point)   [ ] Inflammatory bowel disease                             (1 Point)          [ ] Arthroscopic surgery                                  (2 Points)  [ ] Central venous access                                    (2 Points)            [ x] General surgery lasting >45 minutes      (2 Points)       [ ] Stroke (in the previous month)                  (5 Points)            [ ] Elective major lower extremity arthroplasty (5 Points)                                   [  ] Malignancy (present or past include skin melanoma                                          but exclude  basal skin cell)    (2 points)                                      TRAUMA RELATED RISK FACTORS                HEMATOLOGY RELATED FACTORS                                  [ ] Fracture of the hip, pelvis, or leg                       (5 Points)  [ ] Prior episodes of VTE                                     (3 Points)          [ ] Acute spinal cord injury (in the previous month)  (5 Points)  [ ] Positive family history for VTE                         (3 Points)       [ ] Paralysis (less than 1 month)                          (5 Points)  [ ] Prothrombin 12419 A                                      (3 Points)         [ ] Multiple Trauma (within 1month)                 (5Points)                                                                                                                                                                [ ] Factor V Leiden                                          (3 Points)                                OTHER RISK FACTORS                          [ ] Lupus anticoagulants                                     (3 Points)                     [ ] BMI > 40                          (1 Point)                                                         [ ] Anticardiolipin antibodies                                (3 Points)                 [ ] Smoking                              (1Point)                                                [ ] High homocysteine in the blood                      (3 Points)                [  ] Diabetes requiring insulin (1point)                         [ ] Other congenital or acquired thrombophilia       (3 Points)          [  ] Chemotherapy                   (1 Point)  [ ] Heparin induced thrombocytopenia                  (3 Points)             [  ] Blood Transfusion                (1 point)                                                                                                             Total Score [      6    ]                                                                                                                                                                                                                                                                                                                                                                                                                                       IMPROVE Bleeding Risk Score:2.5      Falls Risk:   High ( x )  Mod (  )  Low (  )      FAMILY HISTORY:  FH: heart disease  Mother, , age 75    FH: stroke  Father, , age 87;;;also colon cancer      Denies any personal or familial history of clotting or bleeding disorders.    Allergies    No Known Allergies    Intolerances    REVIEW OF SYSTEMS    (  )Fever	        (  )Constipation	(  )SOB				  (  )Headache   (  )Dysuria  (  )Chills	        (  )Melena	        (  )Dyspnea on exertion (  )Dizziness    (  )Polyuria  (  )Nausea      (  )Hematochezia	(  )Cough                          (  )Syncope      (  )Hematuria  (  )Vomiting   (  )Chest Pain	        (  )Wheezing			  (  )Weakness  (x shoulder pain.  (  )Diarrhea    (  )Palpitations	(  )Anorexia			  ( x )Myalgia       ( x  ) Arthralgia    Pertinent positives in HPI and daily subjective. All other systems negative.    Vital Signs Last 24 Hrs  T(C): 36.6 (21 @ 08:45), Max: 37.2 (21 @ 00:25)  T(F): 97.9 (21 @ 08:45), Max: 98.9 (21 @ 00:25)  HR: 89 (21 @ 08:45) (72 - 91)  BP: 137/72 (21 @ 08:45) (103/67 - 137/72)  BP(mean): --  RR: 18 (21 @ 08:45) (15 - 22)  SpO2: 95% (21 @ 08:45) (94% - 100%)      PHYSICAL EXAM:    Constitutional: Appears Well    Neurological: A& O x 3    Skin: Warm    Respiratory and Thorax: normal effort; Breath sounds: normal; No rales/wheezing/rhonchi  	  Cardiovascular: S1, S2, regular, NMBR	    Gastrointestinal: BS + x 4Q, nontender	    Genitourinary:  Bladder nondistended, nontender    Musculoskeletal:   General Right:   no muscle/joint tenderness,   normal tone, no joint swelling,   ROM: full	    General Left:   +muscle/joint tenderness,   normal tone, no joint swelling,   ROM: limited    Shoulder:  Lt: Drsing CI small amt of dried bld noted; Sling noted; Cap refill good; Radial pulse +; Sensation intact    Lower extrems:   Right: no calf tenderness              negative rekha's sign               + pedal pulses    Left:   no calf tenderness              negative rekha's sign               + pedal pulses    			    MEDICATIONS  (STANDING):  acetaminophen   Tablet .. 650 milliGRAM(s) Oral every 6 hours  artificial  tears Solution 1 Drop(s) Left EYE every 4 hours  aspirin enteric coated 325 milliGRAM(s) Oral daily  celecoxib 200 milliGRAM(s) Oral every 12 hours  lactated ringers. 1000 milliLiter(s) IV Continuous <Continuous>  lisinopril 20 milliGRAM(s) Oral daily  NIFEdipine XL 60 milliGRAM(s) Oral daily  pantoprazole    Tablet 40 milliGRAM(s) Oral before breakfast  polyethylene glycol 3350 17 Gram(s) Oral at bedtime  senna 2 Tablet(s) Oral at bedtime  simvastatin 40 milliGRAM(s) Oral at bedtime  tobramycin 0.3% Ophthalmic Solution 1 Drop(s) Left EYE every 4 hours              **Current DVT Prophylaxis:    LMWH                   (  )  Heparin SQ           (  )  Coumadin             (  )  Xarelto                  (  )  Eliquis                   (  )  Venodynes           ( x )  Ambulation          ( x )  UFH                       (  )  ECASA                   (x  )  Contraindicated  (  )

## 2021-07-30 NOTE — PROGRESS NOTE ADULT - SUBJECTIVE AND OBJECTIVE BOX
pt seen at bedside comfortable in NAD, min pain left shoulder, denies N/T LUE no other complaints  no acute events overnight.     Vital Signs Last 24 Hrs  T(C): 37.2 (30 Jul 2021 00:25), Max: 37.2 (30 Jul 2021 00:25)  T(F): 98.9 (30 Jul 2021 00:25), Max: 98.9 (30 Jul 2021 00:25)  HR: 79 (30 Jul 2021 00:25) (72 - 90)  BP: 115/56 (30 Jul 2021 00:25) (103/67 - 122/80)  BP(mean): --  RR: 16 (30 Jul 2021 00:25) (15 - 22)  SpO2: 96% (30 Jul 2021 00:25) (96% - 100%)      PE left shoulder  FROM wrist and fingers  M/R/U nerves intact   Sensation axillary patch   SILT distally   cap refill brisk   radial pulse 2+

## 2021-07-30 NOTE — PROGRESS NOTE ADULT - SUBJECTIVE AND OBJECTIVE BOX
PCP: Dr. Juan Dennison    CHIEF COMPLAINT:  inc left shoulder pain, H/o OA    HISTORY OF THE PRESENT ILLNESS: this is a 77 yo male with PMH of HTN, HLD, aortic dilatation ~4.0 cm, CKD2, BPV, carotid disease, CAD, valvular disease and OA of his left shoulder  with progressive pain with ROM, with noted crepitus on exam.  He reports he only had temporary relief from cortisone injection, he feels that the pain is now affecting his quality of life and has opted for surgical intervention.. We are consulted for medical management      7/30: seen at bedside, and up amb with pT, progressing very well, denies any CP Or SOB, denies any pain, comfortable    REVIEW OF SYSTEMS:   All 10 systems reviewed in detailed and found to be negative with the exception of what has already been described above    MEDICATIONS  (STANDING):  acetaminophen   Tablet .. 650 milliGRAM(s) Oral every 6 hours  artificial  tears Solution 1 Drop(s) Left EYE every 4 hours  aspirin enteric coated 325 milliGRAM(s) Oral daily  celecoxib 200 milliGRAM(s) Oral every 12 hours  lactated ringers. 1000 milliLiter(s) (75 mL/Hr) IV Continuous <Continuous>  lisinopril 20 milliGRAM(s) Oral daily  NIFEdipine XL 60 milliGRAM(s) Oral daily  pantoprazole    Tablet 40 milliGRAM(s) Oral before breakfast  polyethylene glycol 3350 17 Gram(s) Oral at bedtime  senna 2 Tablet(s) Oral at bedtime  simvastatin 40 milliGRAM(s) Oral at bedtime  tobramycin 0.3% Ophthalmic Solution 1 Drop(s) Left EYE every 4 hours    MEDICATIONS  (PRN):  HYDROmorphone  Injectable 0.5 milliGRAM(s) IV Push once PRN Breakthrough Pain  magnesium hydroxide Suspension 30 milliLiter(s) Oral daily PRN Constipation  melatonin 3 milliGRAM(s) Oral at bedtime PRN Insomnia  ondansetron Injectable 8 milliGRAM(s) IV Push every 8 hours PRN Nausea and/or Vomiting  oxyCODONE    IR 5 milliGRAM(s) Oral every 3 hours PRN Moderate Pain (4 - 6)  oxyCODONE    IR 10 milliGRAM(s) Oral every 3 hours PRN Severe Pain (7 - 10)  prochlorperazine   Injectable 5 milliGRAM(s) IV Push once PRN Nausea and/or Vomiting  traMADol 50 milliGRAM(s) Oral every 4 hours PRN Mild Pain (1 - 3)    Vital Signs Last 24 Hrs  T(C): 36.6 (07-30-21 @ 08:45), Max: 37.2 (07-30-21 @ 00:25)  T(F): 97.9 (07-30-21 @ 08:45), Max: 98.9 (07-30-21 @ 00:25)  HR: 89 (07-30-21 @ 08:45) (72 - 91)  BP: 137/72 (07-30-21 @ 08:45) (103/67 - 137/72)  BP(mean): --  RR: 18 (07-30-21 @ 08:45) (15 - 18)  SpO2: 95% (07-30-21 @ 08:45) (94% - 100%)    CAPILLARY BLOOD GLUCOSE    POCT Blood Glucose.: 112 mg/dL (07.29.21 @ 22:44)   POCT Blood Glucose.: 108 mg/dL (29 Jul 2021 12:56)  POCT Blood Glucose.: 96 mg/dL (29 Jul 2021 09:12)    PHYSICAL EXAM:    GENERAL: Comfortable, no acute distress   HEAD:  Normocephalic, atraumatic  EYES: EOMI, PERRLA  HEENT: Moist mucous membranes  NECK: Supple, No JVD  NERVOUS SYSTEM:  Alert & Oriented X3, Motor Strength 5/5 B/L upper and lower extremities  CHEST/LUNG: Clear to auscultation bilaterally  HEART: Regular rate and rhythm  ABDOMEN: Soft, non tender, Nondistended, Bowel sounds present  GENITOURINARY: Voiding, no palpable bladder  EXTREMITIES:   No clubbing, cyanosis, or edema  MUSCULOSKELETAL- left shoulder dressing c/d/i, sling in place  SKIN-no rash            LABS:                                              12.7   7.00  )-----------( 189      ( 30 Jul 2021 06:29 )             37.2       07-30    140  |  108  |  15  ----------------------------<  134<H>  4.2   |  28  |  0.74    Ca    7.8<L>      30 Jul 2021 06:29                IMPRESSION: 77 yo male with above pmh a/w:  # left shoulder OA  # S/P left TSR    POD#1  pain control  PT eval  Bowel regimen  IVF's  Finish ABXs  DASH diet  PPI  VTE prophylaxis  monitor CBC/BMP      # HTN/CAD  cont ACE/CCB/ASA    # HLD  cont statin    # Vte prophylaxis  AC consult appreciated   EC ASA on d/c home  Venodynes  INC mobility    dispo: home today    Thank you for the consult, will follow         PCP: Dr. Juan Dennison    CHIEF COMPLAINT:  inc left shoulder pain, H/o OA    HISTORY OF THE PRESENT ILLNESS: this is a 75 yo male with PMH of HTN, HLD, aortic dilatation ~4.0 cm, CKD2, BPV, carotid disease, CAD, valvular disease and OA of his left shoulder  with progressive pain with ROM, with noted crepitus on exam.  He reports he only had temporary relief from cortisone injection, he feels that the pain is now affecting his quality of life and has opted for surgical intervention.. We are consulted for medical management      7/30: seen at bedside, and up amb with pT, progressing very well, denies any CP Or SOB, denies any pain, comfortable    REVIEW OF SYSTEMS:   All 10 systems reviewed in detailed and found to be negative with the exception of what has already been described above    Vital Signs Last 24 Hrs  T(C): 36.6 (07-30-21 @ 08:45), Max: 37.2 (07-30-21 @ 00:25)  T(F): 97.9 (07-30-21 @ 08:45), Max: 98.9 (07-30-21 @ 00:25)  HR: 89 (07-30-21 @ 08:45) (72 - 91)  BP: 137/72 (07-30-21 @ 08:45) (103/67 - 137/72)  RR: 18 (07-30-21 @ 08:45) (15 - 18)  SpO2: 95% (07-30-21 @ 08:45) (94% - 100%)      PHYSICAL EXAM:    GENERAL: Comfortable, no acute distress   HEAD:  Normocephalic, atraumatic  EYES: EOMI, PERRLA  HEENT: Moist mucous membranes  NECK: Supple, No JVD  NERVOUS SYSTEM:  Alert & Oriented X3, Motor Strength 5/5 B/L upper and lower extremities  CHEST/LUNG: Clear to auscultation bilaterally  HEART: Regular rate and rhythm  ABDOMEN: Soft, non tender, Nondistended, Bowel sounds present  GENITOURINARY: Voiding, no palpable bladder  EXTREMITIES:   No clubbing, cyanosis, or edema  MUSCULOSKELETAL- left shoulder dressing c/d/i, sling in place  SKIN-no rash            LABS:                                              12.7   7.00  )-----------( 189      ( 30 Jul 2021 06:29 )             37.2       07-30    140  |  108  |  15  ----------------------------<  134<H>  4.2   |  28  |  0.74    Ca    7.8<L>      30 Jul 2021 06:29    MEDICATIONS  (STANDING):  acetaminophen   Tablet .. 650 milliGRAM(s) Oral every 6 hours  artificial  tears Solution 1 Drop(s) Left EYE every 4 hours  aspirin enteric coated 325 milliGRAM(s) Oral daily  celecoxib 200 milliGRAM(s) Oral every 12 hours  lactated ringers. 1000 milliLiter(s) (75 mL/Hr) IV Continuous <Continuous>  lisinopril 20 milliGRAM(s) Oral daily  NIFEdipine XL 60 milliGRAM(s) Oral daily  pantoprazole    Tablet 40 milliGRAM(s) Oral before breakfast  polyethylene glycol 3350 17 Gram(s) Oral at bedtime  senna 2 Tablet(s) Oral at bedtime  simvastatin 40 milliGRAM(s) Oral at bedtime  tobramycin 0.3% Ophthalmic Solution 1 Drop(s) Left EYE every 4 hours    MEDICATIONS  (PRN):  HYDROmorphone  Injectable 0.5 milliGRAM(s) IV Push once PRN Breakthrough Pain  magnesium hydroxide Suspension 30 milliLiter(s) Oral daily PRN Constipation  melatonin 3 milliGRAM(s) Oral at bedtime PRN Insomnia  ondansetron Injectable 8 milliGRAM(s) IV Push every 8 hours PRN Nausea and/or Vomiting  oxyCODONE    IR 5 milliGRAM(s) Oral every 3 hours PRN Moderate Pain (4 - 6)  oxyCODONE    IR 10 milliGRAM(s) Oral every 3 hours PRN Severe Pain (7 - 10)  prochlorperazine   Injectable 5 milliGRAM(s) IV Push once PRN Nausea and/or Vomiting  traMADol 50 milliGRAM(s) Oral every 4 hours PRN Mild Pain (1 - 3)          ASSESSMENT AND PLAN:    75 yo male with above pmh a/w:    # left shoulder OA  # S/P left TSR  POD#1  PAIN CONTROL  physical therapy  incentive spirometry  bowel regimen.       # HTN/CAD  cont ACE/CCB/ASA    # HLD  cont statin    # Vte prophylaxis  AC consult appreciated   EC ASA on d/c home  ACTV8 mobility    dispo: home today

## 2021-07-30 NOTE — DISCHARGE NOTE NURSING/CASE MANAGEMENT/SOCIAL WORK - PATIENT PORTAL LINK FT
You can access the FollowMyHealth Patient Portal offered by [facility] by registering at the following website: [email or web address]. By joining [employer]’s FollowMyHealth portal, you will also be able to view your health information using other applications (apps) compatible with our system.

## 2022-04-07 NOTE — H&P PST ADULT - NEGATIVE RESPIRATORY AND THORAX SYMPTOMS
Follow up in 4-5 months, sooner if necessary     Please get fasting blood test at least 1 day prior to next appointment. Fast for 10 hours. It is OK to take medications. DO DRINK ENOUGH WATER, but no coffee, tea, or other beverages     See Education Department. Call [phone] to schedule appointment    no wheezing/no dyspnea/no cough

## 2025-07-24 ENCOUNTER — APPOINTMENT (OUTPATIENT)
Facility: CLINIC | Age: 80
End: 2025-07-24
Payer: MEDICARE

## 2025-07-24 VITALS — HEIGHT: 71 IN | WEIGHT: 195 LBS | BODY MASS INDEX: 27.3 KG/M2

## 2025-07-24 DIAGNOSIS — E78.00 PURE HYPERCHOLESTEROLEMIA, UNSPECIFIED: ICD-10-CM

## 2025-07-24 DIAGNOSIS — I10 ESSENTIAL (PRIMARY) HYPERTENSION: ICD-10-CM

## 2025-07-24 PROCEDURE — 99203 OFFICE O/P NEW LOW 30 MIN: CPT

## 2025-07-24 PROCEDURE — 73030 X-RAY EXAM OF SHOULDER: CPT | Mod: LT

## 2025-07-24 PROCEDURE — 73010 X-RAY EXAM OF SHOULDER BLADE: CPT | Mod: LT

## 2025-07-24 RX ORDER — SIMVASTATIN 20 MG/1
20 TABLET, FILM COATED ORAL
Refills: 0 | Status: ACTIVE | COMMUNITY

## 2025-07-25 ENCOUNTER — RESULT REVIEW (OUTPATIENT)
Age: 80
End: 2025-07-25

## 2025-07-30 ENCOUNTER — NON-APPOINTMENT (OUTPATIENT)
Age: 80
End: 2025-07-30

## 2025-08-11 ENCOUNTER — APPOINTMENT (OUTPATIENT)
Facility: CLINIC | Age: 80
End: 2025-08-11
Payer: MEDICARE

## 2025-08-11 VITALS — BODY MASS INDEX: 27.3 KG/M2 | WEIGHT: 195 LBS | HEIGHT: 71 IN

## 2025-08-11 DIAGNOSIS — Z78.9 OTHER SPECIFIED HEALTH STATUS: ICD-10-CM

## 2025-08-11 DIAGNOSIS — M19.011 PRIMARY OSTEOARTHRITIS, RIGHT SHOULDER: ICD-10-CM

## 2025-08-11 DIAGNOSIS — Z96.619 PRESENCE OF UNSPECIFIED ARTIFICIAL SHOULDER JOINT: ICD-10-CM

## 2025-08-11 DIAGNOSIS — M75.42 IMPINGEMENT SYNDROME OF LEFT SHOULDER: ICD-10-CM

## 2025-08-11 PROCEDURE — J3490M: CUSTOM | Mod: NC,JZ

## 2025-08-11 PROCEDURE — 73010 X-RAY EXAM OF SHOULDER BLADE: CPT | Mod: RT

## 2025-08-11 PROCEDURE — 20610 DRAIN/INJ JOINT/BURSA W/O US: CPT | Mod: 50

## 2025-08-11 PROCEDURE — 73030 X-RAY EXAM OF SHOULDER: CPT | Mod: RT

## 2025-08-11 PROCEDURE — 99213 OFFICE O/P EST LOW 20 MIN: CPT | Mod: 25

## 2025-08-13 PROBLEM — M75.42 IMPINGEMENT SYNDROME OF SHOULDER, LEFT: Status: ACTIVE | Noted: 2025-08-13

## 2025-08-13 PROBLEM — M19.011 PRIMARY OSTEOARTHRITIS OF RIGHT SHOULDER: Status: ACTIVE | Noted: 2025-08-13

## 2025-08-13 PROBLEM — Z96.619 HISTORY OF SHOULDER REPLACEMENT: Status: ACTIVE | Noted: 2025-07-24
